# Patient Record
Sex: FEMALE | Race: WHITE | NOT HISPANIC OR LATINO | Employment: OTHER | URBAN - METROPOLITAN AREA
[De-identification: names, ages, dates, MRNs, and addresses within clinical notes are randomized per-mention and may not be internally consistent; named-entity substitution may affect disease eponyms.]

---

## 2017-01-14 LAB
25(OH)D3+25(OH)D2 SERPL-MCNC: 42.2 NG/ML (ref 30–100)
ALBUMIN SERPL-MCNC: 3.7 G/DL (ref 3.6–4.8)
ALBUMIN/GLOB SERPL: 1.5 {RATIO} (ref 1.1–2.5)
ALP SERPL-CCNC: 54 IU/L (ref 39–117)
ALT SERPL-CCNC: 13 IU/L (ref 0–32)
AST SERPL-CCNC: 17 IU/L (ref 0–40)
BILIRUB SERPL-MCNC: 0.5 MG/DL (ref 0–1.2)
BUN SERPL-MCNC: 13 MG/DL (ref 8–27)
BUN/CREAT SERPL: 19 (ref 11–26)
CALCIUM SERPL-MCNC: 8.8 MG/DL (ref 8.7–10.3)
CHLORIDE SERPL-SCNC: 101 MMOL/L (ref 96–106)
CHOLEST SERPL-MCNC: 182 MG/DL (ref 100–199)
CK SERPL-CCNC: 54 U/L (ref 24–173)
CO2 SERPL-SCNC: 23 MMOL/L (ref 18–29)
COMMENT 011824: ABNORMAL
CREAT SERPL-MCNC: 0.69 MG/DL (ref 0.57–1)
FOLATE SERPL-MCNC: 13.9 NG/ML
GLOBULIN SER CALC-MCNC: 2.5 G/DL (ref 1.5–4.5)
GLUCOSE SERPL-MCNC: 83 MG/DL (ref 65–99)
HDLC SERPL-MCNC: 68 MG/DL
LDLC SERPL CALC-MCNC: 103 MG/DL (ref 0–99)
POTASSIUM SERPL-SCNC: 4.1 MMOL/L (ref 3.5–5.2)
PROT SERPL-MCNC: 6.2 G/DL (ref 6–8.5)
SODIUM SERPL-SCNC: 140 MMOL/L (ref 134–144)
TRIGL SERPL-MCNC: 57 MG/DL (ref 0–149)
VIT B12 SERPL-MCNC: >2000 PG/ML (ref 211–946)
VLDLC SERPL CALC-MCNC: 11 MG/DL (ref 5–40)

## 2017-01-17 ENCOUNTER — OFFICE VISIT (OUTPATIENT)
Dept: MEDICAL GROUP | Facility: MEDICAL CENTER | Age: 72
End: 2017-01-17
Payer: MEDICARE

## 2017-01-17 VITALS
DIASTOLIC BLOOD PRESSURE: 78 MMHG | TEMPERATURE: 97.6 F | BODY MASS INDEX: 23.74 KG/M2 | WEIGHT: 134 LBS | OXYGEN SATURATION: 96 % | SYSTOLIC BLOOD PRESSURE: 120 MMHG | HEART RATE: 72 BPM | HEIGHT: 63 IN

## 2017-01-17 DIAGNOSIS — E53.8 B12 DEFICIENCY: ICD-10-CM

## 2017-01-17 DIAGNOSIS — E78.5 HYPERLIPIDEMIA LDL GOAL <130: ICD-10-CM

## 2017-01-17 PROCEDURE — 99213 OFFICE O/P EST LOW 20 MIN: CPT | Performed by: NURSE PRACTITIONER

## 2017-01-17 RX ORDER — ROSUVASTATIN CALCIUM 5 MG/1
5 TABLET, COATED ORAL
Qty: 96 TAB | Refills: 0 | Status: SHIPPED | OUTPATIENT
Start: 2017-01-17 | End: 2017-10-09 | Stop reason: SDUPTHER

## 2017-01-17 NOTE — MR AVS SNAPSHOT
"        Stephania Galloway   2017 11:00 AM   Office Visit   MRN: [id number]    Department:  [facility]   Dept Phone:  [phone]    Description:  Female : 1945   Provider:  DANIEL Pang           Allergies as of 2017     Allergen Noted Reactions    Augmentin 2012       Statins [Hmg-Coa-R Inhibitors] 2016       myalgias      You were diagnosed with     Hyperlipidemia LDL goal <130   [598689]   crestor 5 mg 2x/week. r echeck lab with cmp, lp, cpk in 2 months.  f/u after lab      Vital Signs     Blood Pressure Pulse Temperature Height Weight Body Mass Index    120/78 mmHg 72 36.4 °C (97.6 °F) 1.6 m (5' 3\") 60.782 kg (134 lb) 23.74 kg/m2    Oxygen Saturation Breastfeeding? Smoking Status             96% No Current Some Day Smoker         Basic Information     Date Of Birth Sex Race Ethnicity Preferred Language    1945 Female White Non- English      Your appointments     2017 12:30 PM   [facility] with [facility] [facility] ([address])    [address]  [address]  [facility] [address] [phone]           Check in 30 minutes prior.              Problem List              ICD-10-CM Priority Class Noted - Resolved    Vitamin B12 deficiency E53.8   10/4/2012 - Present    Postmenopausal HRT (hormone replacement therapy) Z79.890   10/4/2012 - Present    Seasonal allergies J30.2   10/4/2012 - Present    Osteopenia M85.80   10/7/2012 - Present    History of melanoma Z85.820   10/7/2012 - Present    Preventative health care Z00.00   10/17/2013 - Present    Vitamin D deficiency disease E55.9   Unknown - Present    Hyperlipidemia LDL goal <130 E78.5   11/3/2016 - Present      Health Maintenance        Date Due Completion Dates    IMM DTaP/Tdap/Td Vaccine (1 - Tdap) 1966 ---    IMM ZOSTER VACCINE 2007 ---    BONE DENSITY 2014 (Done)    Override on 2009: Done (L +1.2, H +1.0)    MAMMOGRAM 11/3/2017 11/3/2016, " 11/3/2016 (Done), 10/29/2015, 10/29/2015 (Done), 10/23/2014, 10/17/2013, 10/11/2012, 11/14/2011 (Done)    Override on 11/3/2016: Done    Override on 10/29/2015: Done    Override on 11/14/2011: Done (per old records)    COLONOSCOPY 12/19/2024 12/19/2014 (Done)    Override on 12/19/2014: Done            Current Immunizations     13-VALENT PCV PREVNAR 12/16/2015    Influenza TIV (IM) 10/17/2013    Influenza Vaccine Adult HD 10/3/2016, 11/19/2015, 10/23/2014    Pneumococcal polysaccharide vaccine (PPSV-23) 10/16/2012 11:20 AM      Below and/or attached are the medications your provider expects you to take. Review all of your home medications and newly ordered medications with your provider and/or pharmacist. Follow medication instructions as directed by your provider and/or pharmacist. Please keep your medication list with you and share with your provider. Update the information when medications are discontinued, doses are changed, or new medications (including over-the-counter products) are added; and carry medication information at all times in the event of emergency situations     Allergies:  AUGMENTIN - (reactions not documented)     STATINS - (reactions not documented)               Medications  Valid as of: January 17, 2017 - 11:16 AM    Generic Name Brand Name Tablet Size Instructions for use    Cyanocobalamin (SL Tab) Vitamin B-12 1000 MCG Place 1 Tab under tongue every 48 hours.          Doxycycline (CAPSULE DELAYED RELEASE) ORACEA 40 MG Take 1 Cap by mouth every morning.        Estradiol (PATCH BIWEEKLY) VIVELLE 0.0375 MG/24HR Apply 1 Patch to skin as directed every 72 hours.        Loratadine (Tab) CLARITIN 10 MG Take 10 mg by mouth every day.        Non Formulary Request Non Formulary Request  Apply 1 Application to affected area(s) every day. DHEA/prog/test 10/50/3mg/gm apply 1 ml topically daily except for sundays.        Rosuvastatin Calcium (Tab) CRESTOR 5 MG Take 1 Tab by mouth every 72 hours.        .                  Medicines prescribed today were sent to:     [facility] #[id number] - [place], [place] - [address]    [address] [place] [address]    Phone: [phone] Fax: [phone]    Open 24 Hours?: No    [facility] - [place] - [address]    [address] [place] [address]    Phone: [phone] Fax: [phone]    Open 24 Hours?: No      Medication refill instructions:       If your prescription bottle indicates you have medication refills left, it is not necessary to call your provider’s office. Please contact your pharmacy and they will refill your medication.    If your prescription bottle indicates you do not have any refills left, you may request refills at any time through one of the following ways: The online [employer] system (except Urgent Care), by calling your provider’s office, or by asking your pharmacy to contact your provider’s office with a refill request. Medication refills are processed only during regular business hours and may not be available until the next business day. Your provider may request additional information or to have a follow-up visit with you prior to refilling your medication.   *Please Note: Medication refills are assigned a new Rx number when refilled electronically. Your pharmacy may indicate that no refills were authorized even though a new prescription for the same medication is available at the pharmacy. Please request the medicine by name with the pharmacy before contacting your provider for a refill.        Other Notes About Your Plan     Old records scanned.  No immunization dates, colonoscopy report, or other imaging included.           [employer] Access Code: Activation code not generated  Current [employer] Status: Active

## 2017-01-17 NOTE — PROGRESS NOTES
Subjective:      Stephania Galloway is a 71 y.o. female who presents with No chief complaint on file.            HPI  Seen in f/uf or hyperlipidemia. She has been intolerant of many statins.  Was started on crestor 2x/week at last appt.    Reviewed lab with pt. Her LP shows trg and HDL is wnl.  LDL is down from 169 to 103 now.    She needs refills on crestor.  Ran out in early jan.  b12 is >2000,.  She is taking b12 daily.    Folate, CMP, GFR, D is wnl.  CPK was up to 381 on other statin in may.  Now down to normal.  Not having any s/e to crestor.  All leg pain is resolved.    Patient Active Problem List    Diagnosis Date Noted   • Hyperlipidemia LDL goal <130 11/03/2016   • Vitamin D deficiency disease    • Preventative health care 10/17/2013   • Osteopenia 10/07/2012   • History of melanoma 10/07/2012   • Vitamin B12 deficiency 10/04/2012   • Postmenopausal HRT (hormone replacement therapy) 10/04/2012   • Seasonal allergies 10/04/2012     Current Outpatient Prescriptions   Medication Sig Dispense Refill   • Non Formulary Request Apply 1 Application to affected area(s) every day. DHEA/prog/test 10/50/3mg/gm apply 1 ml topically daily except for sundays. 1 Each 3   • rosuvastatin (CRESTOR) 5 MG Tab Take 1 Tab by mouth every 72 hours. 8 Tab 2   • estradiol (VIVELLE-DOT) 0.0375 MG/24HR patch Apply 1 Patch to skin as directed every 72 hours. 24 Patch 3   • doxycycline (ORACEA) 40 MG capsule Take 1 Cap by mouth every morning. 30 Cap 3   • loratadine (CLARITIN) 10 MG TABS Take 10 mg by mouth every day.     • Cyanocobalamin (VITAMIN B-12) 1000 MCG SUBL Place 1 Tab under tongue every 48 hours.         No current facility-administered medications for this visit.     Allergies   Allergen Reactions   • Augmentin    • Statins [Hmg-Coa-R Inhibitors]      myalgias       ROS    Review of Systems   Constitutional: Negative.  Negative for fever, chills, weight loss, malaise/fatigue and diaphoresis.   HENT: Negative.  Negative for hearing  "loss, ear pain, nosebleeds, congestion, sore throat, neck pain, tinnitus and ear discharge.    Respiratory: Negative.  Negative for cough, hemoptysis, sputum production, shortness of breath, wheezing and stridor.    Cardiovascular: Negative.  Negative for chest pain, palpitations, orthopnea, claudication, leg swelling and PND.   Gastrointestinal: denies nausea, vomiting, diarrhea, constipation, heartburn, melena or hematochezia.  Genitourinary: Denies dysuria, hematuria, urinary incontinence, frequency or urgency.         Objective:     /78 mmHg  Pulse 72  Temp(Src) 36.4 °C (97.6 °F)  Ht 1.6 m (5' 3\")  Wt 60.782 kg (134 lb)  BMI 23.74 kg/m2  SpO2 96%  Breastfeeding? No     Physical Exam      Physical Exam   Vitals reviewed.  Constitutional: oriented to person, place, and time. appears well-developed and well-nourished. No distress.   Cardiovascular: Normal rate, regular rhythm, normal heart sounds and intact distal pulses.  Exam reveals no gallop and no friction rub.  No murmur heard.  No carotid bruits.   Pulmonary/Chest: Effort normal and breath sounds normal. No stridor. No respiratory distress. no wheezes or rales. exhibits no tenderness.   Musculoskeletal: Normal range of motion. exhibits no edema. radha pedal pulses 2+.  Neurological: alert and oriented to person, place, and time. exhibits normal muscle tone. Coordination normal.   Skin: Skin is warm and dry. no diaphoresis.   Psychiatric: normal mood and affect. behavior is normal.            Assessment/Plan:       1. Hyperlipidemia LDL goal <130  rosuvastatin (CRESTOR) 5 MG Tab    crestor 5 mg 2x/week. refilled meds.  f/u 10/17 call for lab slip.     2. B12 deficiency      b12 lab is high.  take only qod.         "

## 2017-02-08 ENCOUNTER — HOSPITAL ENCOUNTER (OUTPATIENT)
Dept: RADIOLOGY | Facility: MEDICAL CENTER | Age: 72
End: 2017-02-08
Attending: NURSE PRACTITIONER
Payer: COMMERCIAL

## 2017-02-08 DIAGNOSIS — R92.2 DENSE BREAST: ICD-10-CM

## 2017-02-08 DIAGNOSIS — R92.30 DENSE BREAST: ICD-10-CM

## 2017-02-08 PROCEDURE — 4410555 US-SCREENING BREAST (SONOCINE)

## 2017-09-27 ENCOUNTER — APPOINTMENT (RX ONLY)
Dept: URBAN - METROPOLITAN AREA CLINIC 4 | Facility: CLINIC | Age: 72
Setting detail: DERMATOLOGY
End: 2017-09-27

## 2017-09-27 DIAGNOSIS — D22 MELANOCYTIC NEVI: ICD-10-CM

## 2017-09-27 DIAGNOSIS — L82.0 INFLAMED SEBORRHEIC KERATOSIS: ICD-10-CM

## 2017-09-27 DIAGNOSIS — L82.1 OTHER SEBORRHEIC KERATOSIS: ICD-10-CM

## 2017-09-27 DIAGNOSIS — D18.0 HEMANGIOMA: ICD-10-CM

## 2017-09-27 DIAGNOSIS — L81.4 OTHER MELANIN HYPERPIGMENTATION: ICD-10-CM

## 2017-09-27 PROBLEM — D18.01 HEMANGIOMA OF SKIN AND SUBCUTANEOUS TISSUE: Status: ACTIVE | Noted: 2017-09-27

## 2017-09-27 PROBLEM — D22.61 MELANOCYTIC NEVI OF RIGHT UPPER LIMB, INCLUDING SHOULDER: Status: ACTIVE | Noted: 2017-09-27

## 2017-09-27 PROBLEM — D22.71 MELANOCYTIC NEVI OF RIGHT LOWER LIMB, INCLUDING HIP: Status: ACTIVE | Noted: 2017-09-27

## 2017-09-27 PROBLEM — D22.72 MELANOCYTIC NEVI OF LEFT LOWER LIMB, INCLUDING HIP: Status: ACTIVE | Noted: 2017-09-27

## 2017-09-27 PROBLEM — D22.4 MELANOCYTIC NEVI OF SCALP AND NECK: Status: ACTIVE | Noted: 2017-09-27

## 2017-09-27 PROBLEM — D22.62 MELANOCYTIC NEVI OF LEFT UPPER LIMB, INCLUDING SHOULDER: Status: ACTIVE | Noted: 2017-09-27

## 2017-09-27 PROBLEM — D22.5 MELANOCYTIC NEVI OF TRUNK: Status: ACTIVE | Noted: 2017-09-27

## 2017-09-27 PROCEDURE — ? COUNSELING

## 2017-09-27 PROCEDURE — ? LIQUID NITROGEN

## 2017-09-27 PROCEDURE — 17110 DESTRUCTION B9 LES UP TO 14: CPT

## 2017-09-27 PROCEDURE — 99213 OFFICE O/P EST LOW 20 MIN: CPT | Mod: 25

## 2017-09-27 ASSESSMENT — LOCATION DETAILED DESCRIPTION DERM
LOCATION DETAILED: PERIUMBILICAL SKIN
LOCATION DETAILED: LEFT MEDIAL BREAST 10-11:00 REGION
LOCATION DETAILED: LEFT MEDIAL SUPERIOR CHEST
LOCATION DETAILED: LEFT SUPERIOR OCCIPITAL SCALP
LOCATION DETAILED: EPIGASTRIC SKIN
LOCATION DETAILED: RIGHT ANTERIOR DISTAL THIGH
LOCATION DETAILED: SUPERIOR LUMBAR SPINE
LOCATION DETAILED: INFERIOR LUMBAR SPINE
LOCATION DETAILED: RIGHT ANTECUBITAL SKIN
LOCATION DETAILED: RIGHT ANTERIOR PROXIMAL THIGH
LOCATION DETAILED: SUPERIOR THORACIC SPINE
LOCATION DETAILED: LEFT SUPERIOR MEDIAL LOWER BACK
LOCATION DETAILED: RIGHT MEDIAL UPPER BACK
LOCATION DETAILED: RIGHT DORSAL MIDDLE METACARPOPHALANGEAL JOINT
LOCATION DETAILED: RIGHT RADIAL DORSAL HAND
LOCATION DETAILED: MIDDLE STERNUM
LOCATION DETAILED: RIGHT KNEE
LOCATION DETAILED: LEFT ANTERIOR PROXIMAL UPPER ARM
LOCATION DETAILED: RIGHT ANTERIOR DISTAL UPPER ARM
LOCATION DETAILED: LEFT ANTERIOR DISTAL UPPER ARM
LOCATION DETAILED: LEFT ANTERIOR PROXIMAL THIGH
LOCATION DETAILED: LEFT UPPER CUTANEOUS LIP
LOCATION DETAILED: LEFT ANTERIOR DISTAL THIGH
LOCATION DETAILED: STERNAL NOTCH

## 2017-09-27 ASSESSMENT — LOCATION SIMPLE DESCRIPTION DERM
LOCATION SIMPLE: RIGHT HAND
LOCATION SIMPLE: LOWER BACK
LOCATION SIMPLE: LEFT UPPER ARM
LOCATION SIMPLE: RIGHT UPPER ARM
LOCATION SIMPLE: RIGHT THIGH
LOCATION SIMPLE: RIGHT KNEE
LOCATION SIMPLE: LEFT LOWER BACK
LOCATION SIMPLE: LEFT BREAST
LOCATION SIMPLE: LEFT LIP
LOCATION SIMPLE: CHEST
LOCATION SIMPLE: LEFT THIGH
LOCATION SIMPLE: ABDOMEN
LOCATION SIMPLE: UPPER BACK
LOCATION SIMPLE: LEFT OCCIPITAL SCALP
LOCATION SIMPLE: RIGHT UPPER BACK

## 2017-09-27 ASSESSMENT — LOCATION ZONE DERM
LOCATION ZONE: TRUNK
LOCATION ZONE: LIP
LOCATION ZONE: SCALP
LOCATION ZONE: LEG
LOCATION ZONE: HAND
LOCATION ZONE: ARM

## 2017-09-27 NOTE — PROCEDURE: LIQUID NITROGEN
Medical Necessity Clause: This procedure was medically necessary because the lesions that were treated were:
Add 52 Modifier (Optional): no
Detail Level: Detailed
Post-Care Instructions: I reviewed with the patient in detail post-care instructions. Patient is to wear sunprotection, and avoid picking at any of the treated lesions. Pt may apply Vaseline to crusted or scabbing areas.
Medical Necessity Information: It is in your best interest to select a reason for this procedure from the list below. All of these items fulfill various CMS LCD requirements except the new and changing color options.
Consent: The patient's consent was obtained including but not limited to risks of crusting, scabbing, blistering, scarring, darker or lighter pigmentary change, recurrence, incomplete removal and infection.

## 2017-09-27 NOTE — HPI: FULL BODY SKIN EXAMINATION
How Severe Are Your Spot(S)?: mild
What Is The Reason For Today's Visit?: Full Body Skin Examination
What Is The Reason For Today's Visit? (Being Monitored For X): concerning skin lesions on an annual basis
Additional History: Pt states spot on right leg for at least 2months. FBE

## 2017-10-09 DIAGNOSIS — E78.5 HYPERLIPIDEMIA LDL GOAL <130: ICD-10-CM

## 2017-10-09 RX ORDER — ROSUVASTATIN CALCIUM 5 MG/1
TABLET, COATED ORAL
Qty: 96 TAB | Refills: 0 | Status: SHIPPED | OUTPATIENT
Start: 2017-10-09 | End: 2018-03-14

## 2017-10-09 NOTE — LETTER
October 9, 2017        Stephania Galloway  [address] [facility] [facility] [place] [address]        Dear Stephania:    We have received a request from your pharmacy to refill your prescription(s). After careful review of your chart, we have noted you are due for labs and a follow up appointment.  We request you call our office at [phone] at your earliest convenience and make an appointment. I have included a fasting lab order for you.    However, when patients are not followed closely by their physician, potential medication complications may go unadressed. We look forward to scheduling an appointment for you, so that we may provide you with the safest and most complete medical care.        If you have any questions or concerns, please don't hesitate to call.        Sincerely,        OPAL Ogden.    Electronically Signed

## 2017-10-30 ENCOUNTER — HOSPITAL ENCOUNTER (OUTPATIENT)
Dept: LAB | Facility: MEDICAL CENTER | Age: 72
End: 2017-10-30
Attending: NURSE PRACTITIONER
Payer: MEDICARE

## 2017-10-30 DIAGNOSIS — E78.5 HYPERLIPIDEMIA LDL GOAL <130: ICD-10-CM

## 2017-10-30 LAB
CHOLEST SERPL-MCNC: 209 MG/DL (ref 100–199)
HDLC SERPL-MCNC: 70 MG/DL
LDLC SERPL CALC-MCNC: 125 MG/DL
TRIGL SERPL-MCNC: 71 MG/DL (ref 0–149)

## 2017-10-30 PROCEDURE — 80061 LIPID PANEL: CPT

## 2017-10-30 PROCEDURE — 36415 COLL VENOUS BLD VENIPUNCTURE: CPT

## 2017-11-08 ENCOUNTER — HOSPITAL ENCOUNTER (OUTPATIENT)
Dept: RADIOLOGY | Facility: MEDICAL CENTER | Age: 72
End: 2017-11-08
Attending: NURSE PRACTITIONER
Payer: MEDICARE

## 2017-11-08 ENCOUNTER — OFFICE VISIT (OUTPATIENT)
Dept: MEDICAL GROUP | Facility: MEDICAL CENTER | Age: 72
End: 2017-11-08
Payer: MEDICARE

## 2017-11-08 VITALS
OXYGEN SATURATION: 98 % | DIASTOLIC BLOOD PRESSURE: 70 MMHG | HEART RATE: 68 BPM | SYSTOLIC BLOOD PRESSURE: 110 MMHG | HEIGHT: 63 IN | TEMPERATURE: 97 F | WEIGHT: 137 LBS | BODY MASS INDEX: 24.27 KG/M2

## 2017-11-08 DIAGNOSIS — Z79.890 POSTMENOPAUSAL HRT (HORMONE REPLACEMENT THERAPY): ICD-10-CM

## 2017-11-08 DIAGNOSIS — E55.9 VITAMIN D DEFICIENCY DISEASE: ICD-10-CM

## 2017-11-08 DIAGNOSIS — Z23 NEED FOR TDAP VACCINATION: ICD-10-CM

## 2017-11-08 DIAGNOSIS — E78.5 HYPERLIPIDEMIA LDL GOAL <130: ICD-10-CM

## 2017-11-08 DIAGNOSIS — M85.80 OSTEOPENIA, UNSPECIFIED LOCATION: ICD-10-CM

## 2017-11-08 DIAGNOSIS — E53.8 VITAMIN B12 DEFICIENCY: ICD-10-CM

## 2017-11-08 DIAGNOSIS — J30.1 CHRONIC SEASONAL ALLERGIC RHINITIS DUE TO POLLEN: ICD-10-CM

## 2017-11-08 DIAGNOSIS — H40.1211 LOW TENSION GLAUCOMA OF RIGHT EYE, MILD STAGE: ICD-10-CM

## 2017-11-08 DIAGNOSIS — N95.1 MENOPAUSAL STATE: ICD-10-CM

## 2017-11-08 DIAGNOSIS — Z85.820 HISTORY OF MELANOMA: ICD-10-CM

## 2017-11-08 DIAGNOSIS — Z12.31 ENCOUNTER FOR SCREENING MAMMOGRAM FOR BREAST CANCER: ICD-10-CM

## 2017-11-08 PROCEDURE — 90715 TDAP VACCINE 7 YRS/> IM: CPT | Performed by: NURSE PRACTITIONER

## 2017-11-08 PROCEDURE — G0439 PPPS, SUBSEQ VISIT: HCPCS | Mod: 25 | Performed by: NURSE PRACTITIONER

## 2017-11-08 PROCEDURE — G0202 SCR MAMMO BI INCL CAD: HCPCS

## 2017-11-08 PROCEDURE — 90471 IMMUNIZATION ADMIN: CPT | Performed by: NURSE PRACTITIONER

## 2017-11-08 RX ORDER — ESTRADIOL 0.03 MG/D
1 FILM, EXTENDED RELEASE TRANSDERMAL
Qty: 24 PATCH | Refills: 3 | Status: SHIPPED | OUTPATIENT
Start: 2017-11-08 | End: 2018-10-04 | Stop reason: SDUPTHER

## 2017-11-08 ASSESSMENT — PATIENT HEALTH QUESTIONNAIRE - PHQ9: CLINICAL INTERPRETATION OF PHQ2 SCORE: 0

## 2017-11-08 NOTE — ASSESSMENT & PLAN NOTE
She was on crestor. Has not been taking as regularly was ordered. Was supposed to take 2x/wk.  Will restart.  Her LP shows that trg adn HDL are great.  LDL is up from 103 to 125.  Her goal is <100.

## 2017-11-08 NOTE — ASSESSMENT & PLAN NOTE
She is using otc antihistamines.  Now using zyrtec but also uses others.  No sx of infection currently.

## 2017-11-08 NOTE — ASSESSMENT & PLAN NOTE
She was on vivelle dot.  She started having nipple tenderness and thighs swelling.  She stopped the vivelle dot for 2 weeks.  Her sx resolved.  She has restarted at 1/2 the dose.  Needs refills on meds.  She is on 0.0375.

## 2017-11-08 NOTE — PROGRESS NOTES
Subjective:      Stephania Galloway is a 72 y.o. female who presents with Annual Exam            HPI  Seen in fu/ for HRA.  She is feeling great.    She is due a Tdap.    Needs refills on meds.      Hyperlipidemia LDL goal <130  She was on crestor. Has not been taking as regularly was ordered. Was supposed to take 2x/wk.  Will restart.  Her LP shows that trg adn HDL are great.  LDL is up from 103 to 125.  Her goal is <100.      Postmenopausal HRT (hormone replacement therapy)  She was on vivelle dot.  She started having nipple tenderness and thighs swelling.  She stopped the vivelle dot for 2 weeks.  Her sx resolved.  She has restarted at 1/2 the dose.  Needs refills on meds.  She is on 0.0375.      Glaucoma (increased eye pressure)  rt eye.  followed by [facility].  This is anew dx.  On eye gtts.      History of melanoma  Followed by Carrie Blackwood.  Recent eval was wnl.  No current xoncerns.    Osteopenia  Pt declines at this time.  She is using ca++ and d.  Exercises sometimes.      Seasonal allergies  She is using otc antihistamines.  Now using zyrtec but also uses others.  No sx of infection currently.      Vitamin B12 deficiency  She is on otc supplement.  Her last b12 and folate in 1/17 was wnl.      Vitamin D deficiency disease  She is on otc supplement.  Her last d in 1/17 was wnl.          Patient Active Problem List    Diagnosis Date Noted   • Hyperlipidemia LDL goal <130 11/03/2016   • Vitamin D deficiency disease    • Preventative health care 10/17/2013   • Osteopenia 10/07/2012   • History of melanoma 10/07/2012   • Vitamin B12 deficiency 10/04/2012   • Postmenopausal HRT (hormone replacement therapy) 10/04/2012   • Seasonal allergies 10/04/2012     Current Outpatient Prescriptions   Medication Sig Dispense Refill   • rosuvastatin (CRESTOR) 5 MG Tab TAKE 1 TABLET BY MOUTH EVERY 72 HOURS. 96 Tab 0   • Non Formulary Request Apply 1 Application to affected area(s) every day. DHEA/prog/test 10/50/3mg/gm apply 1 ml  topically daily except for sundays. 1 Each 3   • estradiol (VIVELLE-DOT) 0.0375 MG/24HR patch Apply 1 Patch to skin as directed every 72 hours. 24 Patch 3   • doxycycline (ORACEA) 40 MG capsule Take 1 Cap by mouth every morning. 30 Cap 3   • loratadine (CLARITIN) 10 MG TABS Take 10 mg by mouth every day.     • Cyanocobalamin (VITAMIN B-12) 1000 MCG SUBL Place 1 Tab under tongue every 48 hours.         No current facility-administered medications for this visit.      Allergies   Allergen Reactions   • Augmentin    • Statins [Hmg-Coa-R Inhibitors]      myalgias       ROS  Review of Systems   Constitutional: Negative.  Negative for fever, chills, weight loss, malaise/fatigue and diaphoresis.   HENT: Negative.  Negative for hearing loss, ear pain, nosebleeds, congestion, sore throat, neck pain, tinnitus and ear discharge.    Eyes: Negative.  Negative for blurred vision, double vision, photophobia, pain, discharge and redness.   Respiratory: Negative.  Negative for cough, hemoptysis, sputum production, shortness of breath, wheezing and stridor.    Cardiovascular: Negative.  Negative for chest pain, palpitations, orthopnea, claudication, leg swelling and PND.   Gastrointestinal: Negative.  Negative for heartburn, nausea, vomiting, abdominal pain, diarrhea, constipation, blood in stool and melena.   Genitourinary: Negative.  Negative for dysuria, urgency, frequency, incontinence, hematuria and flank pain.   Musculoskeletal: Negative.  Negative for myalgias, back pain, joint pain and falls.   Skin: Negative.  Negative for itching and rash.  followed by derm.    Neurological: Negative.  Negative for dizziness, tingling, tremors, sensory change, speech change, focal weakness, seizures, loss of consciousness, weakness and headaches.   Endo/Heme/Allergies: Negative.  Negative for environmental allergies and polydipsia. Does not bruise/bleed easily.   Psychiatric/Behavioral: Negative.  Negative for depression, suicidal ideas,  hallucinations, memory loss and substance abuse. The patient is not nervous/anxious and does not have insomnia.    All other systems reviewed and are negative.      Chief Complaint   Patient presents with   • Annual Exam         HPI:  Stephania Galloway is a 72 y.o. here for Medicare Annual Wellness Visit     Patient Active Problem List    Diagnosis Date Noted   • Hyperlipidemia LDL goal <130 11/03/2016   • Vitamin D deficiency disease    • Preventative health care 10/17/2013   • Osteopenia 10/07/2012   • History of melanoma 10/07/2012   • Vitamin B12 deficiency 10/04/2012   • Postmenopausal HRT (hormone replacement therapy) 10/04/2012   • Seasonal allergies 10/04/2012       Current Outpatient Prescriptions   Medication Sig Dispense Refill   • rosuvastatin (CRESTOR) 5 MG Tab TAKE 1 TABLET BY MOUTH EVERY 72 HOURS. 96 Tab 0   • Non Formulary Request Apply 1 Application to affected area(s) every day. DHEA/prog/test 10/50/3mg/gm apply 1 ml topically daily except for sundays. 1 Each 3   • estradiol (VIVELLE-DOT) 0.0375 MG/24HR patch Apply 1 Patch to skin as directed every 72 hours. 24 Patch 3   • doxycycline (ORACEA) 40 MG capsule Take 1 Cap by mouth every morning. 30 Cap 3   • loratadine (CLARITIN) 10 MG TABS Take 10 mg by mouth every day.     • Cyanocobalamin (VITAMIN B-12) 1000 MCG SUBL Place 1 Tab under tongue every 48 hours.         No current facility-administered medications for this visit.             Current supplements as per medication list.       Allergies: Augmentin and Statins [hmg-coa-r inhibitors]    Current social contact/activities:   1.  Monthly go to [employer]  2.  Reading  3.  Cooking  4.  Gardening  5.  Golf  6.  crafts     She  reports that she has been smoking.  She has never used smokeless tobacco. She reports that she drinks alcohol. She reports that she does not use drugs.  Ready to quit: No  Counseling given: Yes        DPA/Advanced Directive:  Patient has Advanced Directive, Living Will and Durable  Power of , but it is not on file. Instructed to bring in a copy to scan into their chart.      ROS:    Gait: Uses no assistive device   Ostomy: no   Other tubes: no   Amputations: no   Chronic oxygen use: no   Last eye exam:9/11/17   : Denies incontinence.       Screening:  none  Depression Screening    Little interest or pleasure in doing things?  0 - not at all  Feeling down, depressed , or hopeless? 0 - not at all  Patient Health Questionnaire Score: 0     If depressive symptoms identified deferred to follow up visit unless specifically addressed in assessment and plan.    Interpretation of PHQ-9 Total Score   Score Severity   1-4 No Depression   5-9 Mild Depression   10-14 Moderate Depression   15-19 Moderately Severe Depression   20-27 Severe Depression    Screening for Cognitive Impairment    Three Minute Recall (apple, watch, aimee)  3/3    Draw clock face with all 12 numbers set to the hand to show 10 minutes past 11 o'clock  1    Cognitive concerns identified deferred for follow up unless specifically addressed in assessment and plan.    Fall Risk Assessment    Has the patient had two or more falls in the last year or any fall with injury in the last year?  No    Safety Assessment    Throw rugs on floor.  No  Handrails on all stairs.  Yes  Good lighting in all hallways.  Yes  Difficulty hearing.  No  Patient counseled about all safety risks that were identified.    Functional Assessment ADLs    Are there any barriers preventing you from cooking for yourself or meeting nutritional needs?  No.    Are there any barriers preventing you from driving safely or obtaining transportation?  No.    Are there any barriers preventing you from using a telephone or calling for help?  No.    Are there any barriers preventing you from shopping?  No.    Are there any barriers preventing you from taking care of your own finances?  No.    Are there any barriers preventing you from managing your medications?  No.   "  Are currently engaging any exercise or physical activity?  Yes.       Health Maintenance Summary                IMM DTaP/Tdap/Td Vaccine Overdue 9/9/1966     BONE DENSITY Overdue 11/4/2014      Done 11/4/2009 L +1.2, H +1.0    MAMMOGRAM Overdue 11/3/2017      Done 11/3/2016      Patient has more history with this topic...    Annual Wellness Visit Next Due 11/9/2018      Done 11/8/2017     COLONOSCOPY Next Due 12/19/2024      Done 12/19/2014           Patient Care Team:  DANIEL Ogden as PCP - General (Family Medicine)      Social History   Substance Use Topics   • Smoking status: Current Some Day Smoker   • Smokeless tobacco: Never Used      Comment: only smokes socially   • Alcohol use 0.0 oz/week     Family History   Problem Relation Age of Onset   • Cancer Father      colon cancer dx at age ?   • Cancer Paternal Grandfather 80     colon cancer    • Heart Disease Mother    • Stroke Mother    • Cancer Maternal Grandmother      breast     She  has a past medical history of Allergic rhinitis due to allergen; B12 deficiency; History of melanoma; Hormone replacement therapy (postmenopausal); Hyperlipemia; Osteopenia; and Vitamin D deficiency disease. She also has no past medical history of Breast cancer (CMS-[facility]).   History reviewed. No pertinent surgical history.    Exam:     Blood pressure 110/70, pulse 68, temperature 36.1 °C (97 °F), height 1.6 m (5' 3\"), weight 62.1 kg (137 lb), SpO2 98 %, not currently breastfeeding. Body mass index is 24.27 kg/m².    Hearing excellent.    Dentition good  Alert, oriented in no acute distress.  Eye contact is good, speech goal directed, affect calm      Assessment and Plan. The following treatment and monitoring plan is recommended:          Services suggested: No services needed at this time  Health Care Screening: Age-appropriate preventive services Medicare covers discussed today and ordered if indicated.  Referrals offered: Community-based lifestyle interventions to " "reduce health risks and promote self-management and wellness, fall prevention, nutrition, physical activity, tobacco-use cessation, weight loss, and mental health services as per orders if indicated.    Discussion today about general wellness and lifestyle habits:    · Prevent falls and reduce trip hazards; Cautioned about securing or removing rugs.  · Have a working fire alarm and carbon monoxide detector;   · Engage in regular physical activity and social activities       Follow-up:  Yearly, call for lab slip     Objective:     /70   Pulse 68   Temp 36.1 °C (97 °F)   Ht 1.6 m (5' 3\")   Wt 62.1 kg (137 lb)   SpO2 98%   Breastfeeding? No   BMI 24.27 kg/m²      Physical Exam  Physical Exam   Vitals reviewed.  Constitutional: oriented to person, place, and time. appears well-developed and well-nourished. No distress.   HENT: Head: Normocephalic and atraumatic. Bilateral tympanic membranes wnl w/o bulging.  Right Ear: External ear normal. Left Ear: External ear normal. Nose: Nose normal.  Mouth/Throat: Oropharynx is clear and moist. No oropharyngeal exudate. radha tm wnl. Eyes: Conjunctivae and EOM are normal. Pupils are equal, round, and reactive to light. Right eye exhibits no discharge. Left eye exhibits no discharge. No scleral icterus.    Neck: Normal range of motion. Neck supple. No JVD present.   Cardiovascular: Normal rate, regular rhythm, normal heart sounds and intact distal pulses.  Exam reveals no gallop and no friction rub.  No murmur heard.  No carotid bruits   Pulmonary/Chest: Effort normal and breath sounds normal. No stridor. No respiratory distress. no wheezes or rales. exhibits no tenderness.   Abdominal: Soft. Bowel sounds are normal. exhibits no distension and no mass. No tenderness. no rebound and no guarding.   Musculoskeletal: Normal range of motion. exhibits no edema or tenderness.  radha pedal pulses 2+.  Lymphadenopathy:  no cervical or supraclavicular adenopathy.   Neurological: " alert and oriented to person, place, and time. has normal reflexes. displays normal reflexes. No cranial nerve deficit. exhibits normal muscle tone. Coordination normal.   Skin: Skin is warm and dry. No rash noted. no diaphoresis. No erythema. No pallor.   Psychiatric: normal mood and affect. behavior is normal.               Assessment/Plan:     1. Hyperlipidemia LDL goal <130      take crestor as presc.  f/u yearly.  call for lab slip.     2. Menopausal state      [id number] HRT.  she would like to dec her vivelle dot to 0.025. using 2x/wk.     3. Postmenopausal HRT (hormone replacement therapy)  Non Formulary Request    stable on med. refill meds   4. Low tension glaucoma of right eye, mild stage      followed by ophth.  continue meds   5. History of melanoma      followed by derm.  no current concerns   6. Osteopenia, unspecified location      continue ca++ and d.  start weight bearing exercise.     7. Chronic seasonal allergic rhinitis due to pollen      stable on otc meds   8. Vitamin B12 deficiency      stable on otc meds   9. Vitamin D deficiency disease      stable on otc meds   10. Need for Tdap vaccination  Tdap =>8yo IM     11.  F/u yearly.  Call for lab slip

## 2017-11-09 ENCOUNTER — TELEPHONE (OUTPATIENT)
Dept: MEDICAL GROUP | Facility: MEDICAL CENTER | Age: 72
End: 2017-11-09

## 2017-11-17 DIAGNOSIS — N95.1 MENOPAUSAL STATE: ICD-10-CM

## 2017-11-17 DIAGNOSIS — Z79.890 POSTMENOPAUSAL HRT (HORMONE REPLACEMENT THERAPY): ICD-10-CM

## 2017-11-17 RX ORDER — ESTRADIOL 0.04 MG/D
1 FILM, EXTENDED RELEASE TRANSDERMAL
Qty: 24 PATCH | Refills: 1 | OUTPATIENT
Start: 2017-11-17

## 2018-03-06 ENCOUNTER — HOSPITAL ENCOUNTER (OUTPATIENT)
Dept: LAB | Facility: MEDICAL CENTER | Age: 73
End: 2018-03-06
Attending: NURSE PRACTITIONER
Payer: MEDICARE

## 2018-03-06 DIAGNOSIS — E53.8 VITAMIN B12 DEFICIENCY: ICD-10-CM

## 2018-03-06 DIAGNOSIS — E78.5 HYPERLIPIDEMIA LDL GOAL <130: ICD-10-CM

## 2018-03-06 DIAGNOSIS — E55.9 VITAMIN D DEFICIENCY DISEASE: ICD-10-CM

## 2018-03-06 LAB
25(OH)D3 SERPL-MCNC: 42 NG/ML (ref 30–100)
ALBUMIN SERPL BCP-MCNC: 4.2 G/DL (ref 3.2–4.9)
ALBUMIN/GLOB SERPL: 1.4 G/DL
ALP SERPL-CCNC: 59 U/L (ref 30–99)
ALT SERPL-CCNC: 13 U/L (ref 2–50)
ANION GAP SERPL CALC-SCNC: 7 MMOL/L (ref 0–11.9)
AST SERPL-CCNC: 16 U/L (ref 12–45)
BILIRUB SERPL-MCNC: 0.8 MG/DL (ref 0.1–1.5)
BUN SERPL-MCNC: 14 MG/DL (ref 8–22)
CALCIUM SERPL-MCNC: 9.2 MG/DL (ref 8.5–10.5)
CHLORIDE SERPL-SCNC: 103 MMOL/L (ref 96–112)
CHOLEST SERPL-MCNC: 256 MG/DL (ref 100–199)
CK SERPL-CCNC: 52 U/L (ref 0–154)
CO2 SERPL-SCNC: 28 MMOL/L (ref 20–33)
CREAT SERPL-MCNC: 0.73 MG/DL (ref 0.5–1.4)
FOLATE SERPL-MCNC: 20.7 NG/ML
GLOBULIN SER CALC-MCNC: 2.9 G/DL (ref 1.9–3.5)
GLUCOSE SERPL-MCNC: 82 MG/DL (ref 65–99)
HDLC SERPL-MCNC: 80 MG/DL
LDLC SERPL CALC-MCNC: 164 MG/DL
POTASSIUM SERPL-SCNC: 4 MMOL/L (ref 3.6–5.5)
PROT SERPL-MCNC: 7.1 G/DL (ref 6–8.2)
SODIUM SERPL-SCNC: 138 MMOL/L (ref 135–145)
TRIGL SERPL-MCNC: 60 MG/DL (ref 0–149)
VIT B12 SERPL-MCNC: >1500 PG/ML (ref 211–911)

## 2018-03-06 PROCEDURE — 82306 VITAMIN D 25 HYDROXY: CPT

## 2018-03-06 PROCEDURE — 82746 ASSAY OF FOLIC ACID SERUM: CPT

## 2018-03-06 PROCEDURE — 80061 LIPID PANEL: CPT

## 2018-03-06 PROCEDURE — 80053 COMPREHEN METABOLIC PANEL: CPT

## 2018-03-06 PROCEDURE — 82607 VITAMIN B-12: CPT

## 2018-03-06 PROCEDURE — 82550 ASSAY OF CK (CPK): CPT

## 2018-03-06 PROCEDURE — 36415 COLL VENOUS BLD VENIPUNCTURE: CPT

## 2018-03-14 ENCOUNTER — OFFICE VISIT (OUTPATIENT)
Dept: MEDICAL GROUP | Facility: MEDICAL CENTER | Age: 73
End: 2018-03-14
Payer: MEDICARE

## 2018-03-14 VITALS
HEIGHT: 63 IN | BODY MASS INDEX: 23.57 KG/M2 | HEART RATE: 64 BPM | OXYGEN SATURATION: 98 % | SYSTOLIC BLOOD PRESSURE: 136 MMHG | TEMPERATURE: 98.2 F | DIASTOLIC BLOOD PRESSURE: 74 MMHG | WEIGHT: 133 LBS

## 2018-03-14 DIAGNOSIS — E78.5 HYPERLIPIDEMIA LDL GOAL <130: ICD-10-CM

## 2018-03-14 PROCEDURE — 99214 OFFICE O/P EST MOD 30 MIN: CPT | Performed by: NURSE PRACTITIONER

## 2018-03-14 RX ORDER — COLESEVELAM 180 1/1
1875 TABLET ORAL DAILY
Qty: 60 TAB | Refills: 2 | Status: SHIPPED | OUTPATIENT
Start: 2018-03-14 | End: 2018-05-08 | Stop reason: SDUPTHER

## 2018-03-14 NOTE — PROGRESS NOTES
Subjective:     Stephania Galloway is a 72 y.o. female who presents with hyperlipidemia.    HPI:   Seen in f/u for hyperlipidemia.  She was placed on crestor.  She has severe myalgias. Had to stop the med.  She has failed lipitor, zocor and crestor.    She has glaucoma.  She has failed multiple meds.  She had SOB with one of them.  She was just given xalatan but hasn't started yet. She is going to start tonite.  Her pressures in her eyes are elevated.   reviewed lab with pt.  Her trg and HDL are at goal.  LDL went from 103 on statin in 1/17 to 125 later in 2017. Now up to 164 with stopping all statins.  Goal is <130    Patient Active Problem List    Diagnosis Date Noted   • Glaucoma (increased eye pressure)    • Hyperlipidemia LDL goal <130 11/03/2016   • Vitamin D deficiency disease    • Preventative health care 10/17/2013   • Osteopenia 10/07/2012   • History of melanoma 10/07/2012   • Vitamin B12 deficiency 10/04/2012   • Postmenopausal HRT (hormone replacement therapy) 10/04/2012   • Seasonal allergies 10/04/2012       Current medicines (including changes today)  Current Outpatient Prescriptions   Medication Sig Dispense Refill   • Latanoprost (XALATAN OP) by Ophthalmic route.     • colesevelam (WELCHOL) 625 MG Tab Take 3 Tabs by mouth every day. 60 Tab 2   • Estradiol 0.025 MG/24HR PATCH BIWEEKLY Apply 1 Patch to skin as directed every 72 hours. 24 Patch 3   • loratadine (CLARITIN) 10 MG TABS Take 10 mg by mouth every day.     • Non Formulary Request Apply 1 Application to affected area(s) every day. DHEA/prog/test 10/50/3mg/gm apply 1 ml topically daily except for sundays. 1 Each 3   • doxycycline (ORACEA) 40 MG capsule Take 1 Cap by mouth every morning. 30 Cap 3     No current facility-administered medications for this visit.        Allergies   Allergen Reactions   • Augmentin    • Statins [Hmg-Coa-R Inhibitors]      myalgias       ROS  Constitutional: Negative. Negative for fever, chills, weight loss,  "malaise/fatigue and diaphoresis.   HENT: Negative. Negative for hearing loss, ear pain, nosebleeds, congestion, sore throat, neck pain, tinnitus and ear discharge.   Respiratory: Negative. Negative for cough, hemoptysis, sputum production, shortness of breath, wheezing and stridor.   Cardiovascular: Negative. Negative for chest pain, palpitations, orthopnea, claudication, leg swelling and PND.   Gastrointestinal: Denies nausea, vomiting, diarrhea, constipation, heartburn, melena or hematochezia.  Genitourinary: Denies dysuria, hematuria, urinary incontinence, frequency or urgency.        Objective:     Blood pressure 136/74, pulse 64, temperature 36.8 °C (98.2 °F), height 1.6 m (5' 3\"), weight 60.3 kg (133 lb), SpO2 98 %. Body mass index is 23.56 kg/m².    Physical Exam:  Vitals reviewed.  Constitutional: Oriented to person, place, and time. appears well-developed and well-nourished. No distress.   Cardiovascular: Normal rate, regular rhythm, normal heart sounds and intact distal pulses. Exam reveals no gallop and no friction rub. No murmur heard. No carotid bruits.   Pulmonary/Chest: Effort normal and breath sounds normal. No stridor. No respiratory distress. no wheezes or rales. exhibits no tenderness.   Musculoskeletal: Normal range of motion. exhibits no edema. radha pedal pulses 2+.  Lymphadenopathy: No cervical or supraclavicular adenopathy.   Neurological: Alert and oriented to person, place, and time. exhibits normal muscle tone.  Skin: Skin is warm and dry. No diaphoresis.   Psychiatric: Normal mood and affect. Behavior is normal.      Assessment and Plan:     The following treatment plan was discussed:    1. Hyperlipidemia LDL goal <130  Latanoprost (XALATAN OP)    colesevelam (WELCHOL) 625 MG Tab    LIPID PROFILE    try welchol.  will start slowly at 2 tabs daily.  recheck LP in 3 months.  f/u for review.  if not at goal or has s/e will refer to dr bloch for poss repathy         Followup: Return in about 3 " months (around 6/14/2018).

## 2018-05-08 DIAGNOSIS — E78.5 HYPERLIPIDEMIA LDL GOAL <130: ICD-10-CM

## 2018-05-08 RX ORDER — COLESEVELAM 180 1/1
1875 TABLET ORAL DAILY
Qty: 270 TAB | Refills: 2 | Status: SHIPPED | OUTPATIENT
Start: 2018-05-08 | End: 2018-09-04

## 2018-05-29 DIAGNOSIS — Z79.890 POSTMENOPAUSAL HRT (HORMONE REPLACEMENT THERAPY): ICD-10-CM

## 2018-06-06 LAB
CHOLEST SERPL-MCNC: 244 MG/DL (ref 100–199)
HDLC SERPL-MCNC: 81 MG/DL
LABORATORY COMMENT REPORT: ABNORMAL
LDLC SERPL CALC-MCNC: 146 MG/DL (ref 0–99)
TRIGL SERPL-MCNC: 83 MG/DL (ref 0–149)
VLDLC SERPL CALC-MCNC: 17 MG/DL (ref 5–40)

## 2018-06-12 ENCOUNTER — OFFICE VISIT (OUTPATIENT)
Dept: MEDICAL GROUP | Facility: MEDICAL CENTER | Age: 73
End: 2018-06-12
Payer: MEDICARE

## 2018-06-12 VITALS
WEIGHT: 129.6 LBS | OXYGEN SATURATION: 96 % | HEART RATE: 78 BPM | BODY MASS INDEX: 22.96 KG/M2 | TEMPERATURE: 98.8 F | SYSTOLIC BLOOD PRESSURE: 122 MMHG | HEIGHT: 63 IN | DIASTOLIC BLOOD PRESSURE: 86 MMHG

## 2018-06-12 DIAGNOSIS — Z79.890 POSTMENOPAUSAL HRT (HORMONE REPLACEMENT THERAPY): ICD-10-CM

## 2018-06-12 DIAGNOSIS — E78.5 HYPERLIPIDEMIA LDL GOAL <130: ICD-10-CM

## 2018-06-12 PROCEDURE — 99214 OFFICE O/P EST MOD 30 MIN: CPT | Performed by: NURSE PRACTITIONER

## 2018-06-12 RX ORDER — ATORVASTATIN CALCIUM 10 MG/1
5 TABLET, FILM COATED ORAL
Qty: 12 TAB | Refills: 1 | Status: SHIPPED | OUTPATIENT
Start: 2018-06-12 | End: 2018-09-04 | Stop reason: SDUPTHER

## 2018-06-12 NOTE — PROGRESS NOTES
Subjective:     Stephania Galloway is a 72 y.o. female who presents with hyperlipidemia.    HPI:     Seen in f/u for hyperlipidemia.  She has been on statins in the past.  She will have myalgias on statins but only after long time taking.  She has failed multiple statins.  Has been on lipitor also.  She is on wellchol now.  Her LDLi s better but not at goal.   Reviewed lab wiht pt. Her trg and HDL are great.  LDL is better.  Was on zocor in nov with .  Goal is <130.  Then off med she went up to 164.  Now on wellchol she is down to 146.    She has lost 8 lbs since november.  Eating healthy and exercising since april.    She is having a lot of difficulty with getting her bioidenticals to pharmacy.  We have apprently been sending to [facility] pharmacy.  Will give her [facility]      Patient Active Problem List    Diagnosis Date Noted   • Glaucoma (increased eye pressure)    • Hyperlipidemia LDL goal <130 11/03/2016   • Vitamin D deficiency disease    • Preventative health care 10/17/2013   • Osteopenia 10/07/2012   • History of melanoma 10/07/2012   • Vitamin B12 deficiency 10/04/2012   • Postmenopausal HRT (hormone replacement therapy) 10/04/2012   • Seasonal allergies 10/04/2012       Current medicines (including changes today)  Current Outpatient Prescriptions   Medication Sig Dispense Refill   • Non Formulary Request Apply 1 Application to affected area(s) every day. DHEA/prog/test 10/50/3mg/gm apply 1 ml topically daily except for sundays. 1 Each 3   • atorvastatin (LIPITOR) 10 MG Tab Take 0.5 Tabs by mouth every 72 hours. 12 Tab 1   • colesevelam (WELCHOL) 625 MG Tab Take 3 Tabs by mouth every day. 270 Tab 2   • Latanoprost (XALATAN OP) by Ophthalmic route.     • Estradiol 0.025 MG/24HR PATCH BIWEEKLY Apply 1 Patch to skin as directed every 72 hours. 24 Patch 3   • doxycycline (ORACEA) 40 MG capsule Take 1 Cap by mouth every morning. 30 Cap 3   • loratadine (CLARITIN) 10 MG TABS Take 10 mg by mouth every day.    "    No current facility-administered medications for this visit.        Allergies   Allergen Reactions   • Augmentin    • Statins [Hmg-Coa-R Inhibitors]      myalgias       ROS  Constitutional: Negative. Negative for fever, chills, weight loss, malaise/fatigue and diaphoresis.   HENT: Negative. Negative for hearing loss, ear pain, nosebleeds, congestion, sore throat, neck pain, tinnitus and ear discharge.   Respiratory: Negative. Negative for cough, hemoptysis, sputum production, shortness of breath, wheezing and stridor.   Cardiovascular: Negative. Negative for chest pain, palpitations, orthopnea, claudication, leg swelling and PND.   Gastrointestinal: Denies nausea, vomiting, diarrhea, constipation, heartburn, melena or hematochezia.  Genitourinary: Denies dysuria, hematuria, urinary incontinence, frequency or urgency.        Objective:     Blood pressure 122/86, pulse 78, temperature 37.1 °C (98.8 °F), height 1.6 m (5' 3\"), weight 58.8 kg (129 lb 9.6 oz), SpO2 96 %. Body mass index is 22.96 kg/m².    Physical Exam:  Vitals reviewed.  Constitutional: Oriented to person, place, and time. appears well-developed and well-nourished. No distress.   Cardiovascular: Normal rate, regular rhythm, normal heart sounds and intact distal pulses. Exam reveals no gallop and no friction rub. No murmur heard. No carotid bruits.   Pulmonary/Chest: Effort normal and breath sounds normal. No stridor. No respiratory distress. no wheezes or rales. exhibits no tenderness.   Musculoskeletal: Normal range of motion. exhibits no edema. radha pedal pulses 2+.  Lymphadenopathy: No cervical or supraclavicular adenopathy.   Neurological: Alert and oriented to person, place, and time. exhibits normal muscle tone.  Skin: Skin is warm and dry. No diaphoresis.   Psychiatric: Normal mood and affect. Behavior is normal.      Assessment and Plan:     The following treatment plan was discussed:    1. Hyperlipidemia LDL goal <130  atorvastatin (LIPITOR) " 10 MG Tab    COMP METABOLIC PANEL    LIPID PROFILE    try 5 mg lipitor 2x/wk.  if s/e to this will not be able to do statins.  will retry bile acid sequstrans.    2. Postmenopausal HRT (hormone replacement therapy)  Non Formulary Request    DISCONTINUED: Non Formulary Request    stable on med. refill meds         Followup: Return in about 4 months (around 10/12/2018).

## 2018-07-06 ENCOUNTER — OFFICE VISIT (OUTPATIENT)
Dept: URGENT CARE | Facility: CLINIC | Age: 73
End: 2018-07-06
Payer: MEDICARE

## 2018-07-06 VITALS
OXYGEN SATURATION: 99 % | DIASTOLIC BLOOD PRESSURE: 62 MMHG | TEMPERATURE: 97.6 F | SYSTOLIC BLOOD PRESSURE: 126 MMHG | WEIGHT: 129 LBS | HEIGHT: 63 IN | HEART RATE: 68 BPM | RESPIRATION RATE: 16 BRPM | BODY MASS INDEX: 22.86 KG/M2

## 2018-07-06 DIAGNOSIS — J01.00 ACUTE NON-RECURRENT MAXILLARY SINUSITIS: ICD-10-CM

## 2018-07-06 PROCEDURE — 99204 OFFICE O/P NEW MOD 45 MIN: CPT | Performed by: PHYSICIAN ASSISTANT

## 2018-07-06 RX ORDER — DOXYCYCLINE HYCLATE 100 MG/1
100 CAPSULE ORAL 2 TIMES DAILY
Qty: 14 EACH | Refills: 0 | Status: SHIPPED | OUTPATIENT
Start: 2018-07-06 | End: 2018-07-13

## 2018-07-06 ASSESSMENT — ENCOUNTER SYMPTOMS
CHILLS: 0
SORE THROAT: 0
EYE PAIN: 0
FEVER: 0
WHEEZING: 0
EYE REDNESS: 0
EYE DISCHARGE: 0
DIZZINESS: 1
SINUS PRESSURE: 1
SHORTNESS OF BREATH: 0
PALPITATIONS: 0
COUGH: 0
SINUS PAIN: 1
HEADACHES: 1

## 2018-07-06 NOTE — PROGRESS NOTES
Subjective:      Stephania Galloway is a 72 y.o. female who presents with Nasal Congestion            Sinus Problem   This is a new problem. The current episode started 1 to 4 weeks ago. The problem has been gradually worsening since onset. Associated symptoms include congestion, ear pain, headaches, sinus pressure and sneezing. Pertinent negatives include no chills, coughing, shortness of breath or sore throat. Past treatments include oral decongestants. The treatment provided no relief.       Review of Systems   Constitutional: Positive for malaise/fatigue. Negative for chills and fever.   HENT: Positive for congestion, ear pain, sinus pain, sinus pressure and sneezing. Negative for sore throat.    Eyes: Negative for pain, discharge and redness.   Respiratory: Negative for cough, shortness of breath and wheezing.    Cardiovascular: Negative for chest pain and palpitations.   Neurological: Positive for dizziness and headaches.   All other systems reviewed and are negative.    PMH:  has a past medical history of Allergic rhinitis due to allergen; B12 deficiency; Glaucoma (increased eye pressure); History of melanoma; Hormone replacement therapy (postmenopausal); Hyperlipemia; Osteopenia; and Vitamin D deficiency disease. She also has no past medical history of Breast cancer (HCC).  MEDS:   Current Outpatient Prescriptions:   •  doxycycline (VIBRAMYCIN) 100 MG Cap, Take 1 Cap by mouth 2 times a day for 7 days., Disp: 14 Each, Rfl: 0  •  atorvastatin (LIPITOR) 10 MG Tab, Take 0.5 Tabs by mouth every 72 hours., Disp: 12 Tab, Rfl: 1  •  Estradiol 0.025 MG/24HR PATCH BIWEEKLY, Apply 1 Patch to skin as directed every 72 hours., Disp: 24 Patch, Rfl: 3  •  doxycycline (ORACEA) 40 MG capsule, Take 1 Cap by mouth every morning., Disp: 30 Cap, Rfl: 3  •  loratadine (CLARITIN) 10 MG TABS, Take 10 mg by mouth every day., Disp: , Rfl:   •  Non Formulary Request, Apply 1 Application to affected area(s) every day. DHEA/prog/test  "10/50/3mg/gm apply 1 ml topically daily except for sundays., Disp: 1 Each, Rfl: 3  •  colesevelam (WELCHOL) 625 MG Tab, Take 3 Tabs by mouth every day., Disp: 270 Tab, Rfl: 2  •  Latanoprost (XALATAN OP), by Ophthalmic route., Disp: , Rfl:   ALLERGIES:   Allergies   Allergen Reactions   • Augmentin    • Statins [Hmg-Coa-R Inhibitors]      myalgias     SURGHX: No past surgical history on file.  SOCHX:  reports that she has been smoking.  She has never used smokeless tobacco. She reports that she drinks alcohol. She reports that she does not use drugs.  FH: Family history was reviewed, no pertinent findings to report  Medications, Allergies, and current problem list reviewed today in Epic       Objective:     /62   Pulse 68   Temp 36.4 °C (97.6 °F)   Resp 16   Ht 1.6 m (5' 2.99\")   Wt 58.5 kg (129 lb)   SpO2 99%   Breastfeeding? No   BMI 22.86 kg/m²      Physical Exam   Constitutional: She is oriented to person, place, and time. She appears well-developed and well-nourished.  Non-toxic appearance. She does not have a sickly appearance. She does not appear ill. No distress.   HENT:   Head: Normocephalic and atraumatic.   Right Ear: Hearing, tympanic membrane, external ear and ear canal normal.   Left Ear: Hearing, tympanic membrane, external ear and ear canal normal.   Nose: Mucosal edema and rhinorrhea present. No sinus tenderness. No epistaxis. Right sinus exhibits maxillary sinus tenderness. Left sinus exhibits maxillary sinus tenderness.   Mouth/Throat: Uvula is midline, oropharynx is clear and moist and mucous membranes are normal.   Eyes: Conjunctivae and EOM are normal.   Neck: Normal range of motion. Neck supple.   Cardiovascular: Normal rate, regular rhythm, normal heart sounds and intact distal pulses.    Pulmonary/Chest: Effort normal and breath sounds normal.   Neurological: She is alert and oriented to person, place, and time.   Skin: Skin is warm and dry.   Psychiatric: She has a normal mood " and affect. Her behavior is normal. Judgment and thought content normal.   Vitals reviewed.              Assessment/Plan:     1. Acute non-recurrent maxillary sinusitis    - doxycycline (VIBRAMYCIN) 100 MG Cap; Take 1 Cap by mouth 2 times a day for 7 days.  Dispense: 14 Each; Refill: 0    Differential diagnosis, natural history, supportive care discussed. Follow-up with primary care provider within 7-10 days, emergency room precautions discussed.  Patient and/or family appears understanding of information.  Handout and review of patients diagnosis and treatment was discussed extensively.

## 2018-08-15 LAB
ALBUMIN SERPL-MCNC: 4 G/DL (ref 3.5–4.8)
ALBUMIN/GLOB SERPL: 1.5 {RATIO} (ref 1.2–2.2)
ALP SERPL-CCNC: 77 IU/L (ref 39–117)
ALT SERPL-CCNC: 11 IU/L (ref 0–32)
AST SERPL-CCNC: 15 IU/L (ref 0–40)
BILIRUB SERPL-MCNC: 0.5 MG/DL (ref 0–1.2)
BUN SERPL-MCNC: 17 MG/DL (ref 8–27)
BUN/CREAT SERPL: 24 (ref 12–28)
CALCIUM SERPL-MCNC: 9 MG/DL (ref 8.7–10.3)
CHLORIDE SERPL-SCNC: 103 MMOL/L (ref 96–106)
CHOLEST SERPL-MCNC: 214 MG/DL (ref 100–199)
CO2 SERPL-SCNC: 23 MMOL/L (ref 20–29)
CREAT SERPL-MCNC: 0.71 MG/DL (ref 0.57–1)
GLOBULIN SER CALC-MCNC: 2.6 G/DL (ref 1.5–4.5)
GLUCOSE SERPL-MCNC: 83 MG/DL (ref 65–99)
HDLC SERPL-MCNC: 82 MG/DL
IF AFRICAN AMERICAN  100797: 100 ML/MIN/1.73
IF NON AFRICAN AMER 100791: 87 ML/MIN/1.73
LABORATORY COMMENT REPORT: ABNORMAL
LDLC SERPL CALC-MCNC: 119 MG/DL (ref 0–99)
POTASSIUM SERPL-SCNC: 4.3 MMOL/L (ref 3.5–5.2)
PROT SERPL-MCNC: 6.6 G/DL (ref 6–8.5)
SODIUM SERPL-SCNC: 140 MMOL/L (ref 134–144)
TRIGL SERPL-MCNC: 65 MG/DL (ref 0–149)
VLDLC SERPL CALC-MCNC: 13 MG/DL (ref 5–40)

## 2018-09-04 ENCOUNTER — OFFICE VISIT (OUTPATIENT)
Dept: MEDICAL GROUP | Facility: MEDICAL CENTER | Age: 73
End: 2018-09-04
Payer: MEDICARE

## 2018-09-04 VITALS
TEMPERATURE: 98.1 F | BODY MASS INDEX: 22.68 KG/M2 | OXYGEN SATURATION: 97 % | DIASTOLIC BLOOD PRESSURE: 70 MMHG | SYSTOLIC BLOOD PRESSURE: 108 MMHG | HEART RATE: 69 BPM | WEIGHT: 128 LBS | HEIGHT: 63 IN

## 2018-09-04 DIAGNOSIS — E78.5 HYPERLIPIDEMIA LDL GOAL <130: ICD-10-CM

## 2018-09-04 PROCEDURE — 99214 OFFICE O/P EST MOD 30 MIN: CPT | Performed by: NURSE PRACTITIONER

## 2018-09-04 RX ORDER — ATORVASTATIN CALCIUM 10 MG/1
5 TABLET, FILM COATED ORAL
Qty: 36 TAB | Refills: 2 | Status: SHIPPED | OUTPATIENT
Start: 2018-09-04 | End: 2018-11-19 | Stop reason: SDUPTHER

## 2018-09-04 NOTE — PROGRESS NOTES
Subjective:     Stephania Galloway is a 72 y.o. female who presents with hyperlipidemia.    HPI:   Seen in f/u for hyperlipidemia.  She is doing well.  She was started on lipitor 0.5 tab of 10 mg recently.  Has a hx of not svitlana statins.  Doing well now on 1.5 tabs every72 hrs.    Reviewed lab with pt.  Her GFR, CMP is wnl  LP shows trg and HDL are still at goal.  LDL is down from 146 to 119.  Goal is <100.      Patient Active Problem List    Diagnosis Date Noted   • Glaucoma (increased eye pressure)    • Hyperlipidemia LDL goal <130 11/03/2016   • Vitamin D deficiency disease    • Preventative health care 10/17/2013   • Osteopenia 10/07/2012   • History of melanoma 10/07/2012   • Vitamin B12 deficiency 10/04/2012   • Postmenopausal HRT (hormone replacement therapy) 10/04/2012   • Seasonal allergies 10/04/2012       Current medicines (including changes today)  Current Outpatient Prescriptions   Medication Sig Dispense Refill   • atorvastatin (LIPITOR) 10 MG Tab Take 0.5 Tabs by mouth every 72 hours. 36 Tab 2   • Non Formulary Request Apply 1 Application to affected area(s) every day. DHEA/prog/test 10/50/3mg/gm apply 1 ml topically daily except for sundays. 1 Each 3   • Latanoprost (XALATAN OP) by Ophthalmic route.     • Estradiol 0.025 MG/24HR PATCH BIWEEKLY Apply 1 Patch to skin as directed every 72 hours. 24 Patch 3   • doxycycline (ORACEA) 40 MG capsule Take 1 Cap by mouth every morning. 30 Cap 3   • loratadine (CLARITIN) 10 MG TABS Take 10 mg by mouth every day.       No current facility-administered medications for this visit.        Allergies   Allergen Reactions   • Augmentin    • Statins [Hmg-Coa-R Inhibitors]      myalgias       ROS  Constitutional: Negative. Negative for fever, chills, weight loss, malaise/fatigue and diaphoresis.   HENT: Negative. Negative for hearing loss, ear pain, nosebleeds, congestion, sore throat, neck pain, tinnitus and ear discharge.   Respiratory: Negative. Negative for cough,  "hemoptysis, sputum production, shortness of breath, wheezing and stridor.   Cardiovascular: Negative. Negative for chest pain, palpitations, orthopnea, claudication, leg swelling and PND.   Gastrointestinal: Denies nausea, vomiting, diarrhea, constipation, heartburn, melena or hematochezia.  Genitourinary: Denies dysuria, hematuria, urinary incontinence, frequency or urgency.        Objective:     Blood pressure 108/70, pulse 69, temperature 36.7 °C (98.1 °F), height 1.6 m (5' 3\"), weight 58.1 kg (128 lb), SpO2 97 %, not currently breastfeeding. Body mass index is 22.67 kg/m².    Physical Exam:  Vitals reviewed.  Constitutional: Oriented to person, place, and time. appears well-developed and well-nourished. No distress.   Cardiovascular: Normal rate, regular rhythm, normal heart sounds and intact distal pulses. Exam reveals no gallop and no friction rub. No murmur heard. No carotid bruits.   Pulmonary/Chest: Effort normal and breath sounds normal. No stridor. No respiratory distress. no wheezes or rales. exhibits no tenderness.   Musculoskeletal: Normal range of motion. exhibits no edema. radha pedal pulses 2+.  Lymphadenopathy: No cervical or supraclavicular adenopathy.   Neurological: Alert and oriented to person, place, and time. exhibits normal muscle tone.  Skin: Skin is warm and dry. No diaphoresis.   Psychiatric: Normal mood and affect. Behavior is normal.      Assessment and Plan:     The following treatment plan was discussed:    1. Hyperlipidemia LDL goal <130  atorvastatin (LIPITOR) 10 MG Tab    stable on meds.  refilled lipitor.  f/u for HRA in 11/18.  then plan f/u yearly.          Followup: Return in about 2 months (around 11/4/2018).  "

## 2018-09-17 ENCOUNTER — OFFICE VISIT (OUTPATIENT)
Dept: MEDICAL GROUP | Facility: MEDICAL CENTER | Age: 73
End: 2018-09-17
Payer: MEDICARE

## 2018-09-17 VITALS
TEMPERATURE: 98.5 F | WEIGHT: 129.6 LBS | HEART RATE: 69 BPM | OXYGEN SATURATION: 94 % | HEIGHT: 63 IN | DIASTOLIC BLOOD PRESSURE: 60 MMHG | SYSTOLIC BLOOD PRESSURE: 100 MMHG | BODY MASS INDEX: 22.96 KG/M2

## 2018-09-17 DIAGNOSIS — R04.0 NOSEBLEED: ICD-10-CM

## 2018-09-17 DIAGNOSIS — Z23 NEED FOR SHINGLES VACCINE: ICD-10-CM

## 2018-09-17 DIAGNOSIS — J01.00 ACUTE NON-RECURRENT MAXILLARY SINUSITIS: ICD-10-CM

## 2018-09-17 PROCEDURE — 99214 OFFICE O/P EST MOD 30 MIN: CPT | Performed by: NURSE PRACTITIONER

## 2018-09-17 RX ORDER — DOXYCYCLINE HYCLATE 100 MG
100 TABLET ORAL 2 TIMES DAILY
Qty: 20 TAB | Refills: 0 | Status: SHIPPED | OUTPATIENT
Start: 2018-09-17 | End: 2018-11-19

## 2018-09-17 NOTE — PROGRESS NOTES
Subjective:     Stephania Galloway is a 73 y.o. female who presents with nosebleed.    HPI:   Seen in f/u for nose bleed.  Its been occurring in am daily for a week.  Using ponarix.  Had this several eyars ago that had to be cauterized.    Tried vaseline.    Now thinks that she is getting sinus infection with congestion.  havign green nasal secretions. + headache, sinus pressure and hear pressure.  No cough.  No sore throat.  No SOB or wheezing.    She is due shingrix.  Will get flu when well.    Patient Active Problem List    Diagnosis Date Noted   • Glaucoma (increased eye pressure)    • Hyperlipidemia LDL goal <130 11/03/2016   • Vitamin D deficiency disease    • Preventative health care 10/17/2013   • Osteopenia 10/07/2012   • History of melanoma 10/07/2012   • Vitamin B12 deficiency 10/04/2012   • Postmenopausal HRT (hormone replacement therapy) 10/04/2012   • Seasonal allergies 10/04/2012       Current medicines (including changes today)  Current Outpatient Prescriptions   Medication Sig Dispense Refill   • doxycycline (VIBRAMYCIN) 100 MG Tab Take 1 Tab by mouth 2 times a day. 20 Tab 0   • Zoster Vac Recomb Adjuvanted (SHINGRIX) 50 MCG Recon Susp 0.5 mL by Intramuscular route Once for 1 dose. 0.5 mL 0   • atorvastatin (LIPITOR) 10 MG Tab Take 0.5 Tabs by mouth every 72 hours. 36 Tab 2   • Non Formulary Request Apply 1 Application to affected area(s) every day. DHEA/prog/test 10/50/3mg/gm apply 1 ml topically daily except for sundays. 1 Each 3   • Latanoprost (XALATAN OP) by Ophthalmic route.     • Estradiol 0.025 MG/24HR PATCH BIWEEKLY Apply 1 Patch to skin as directed every 72 hours. 24 Patch 3   • doxycycline (ORACEA) 40 MG capsule Take 1 Cap by mouth every morning. 30 Cap 3   • loratadine (CLARITIN) 10 MG TABS Take 10 mg by mouth every day.       No current facility-administered medications for this visit.        Allergies   Allergen Reactions   • Augmentin    • Statins [Hmg-Coa-R Inhibitors]      myalgias  "      ROS  Constitutional: Negative. Negative for fever, chills, weight loss, malaise/fatigue and diaphoresis.   HENT: Negative. Negative for hearing loss, ear pain, sore throat, neck pain, tinnitus and ear discharge.   Respiratory: Negative. Negative for hemoptysis, sputum production, shortness of breath, wheezing and stridor.   Cardiovascular: Negative. Negative for chest pain, palpitations, orthopnea, claudication, leg swelling and PND.   Gastrointestinal: Denies nausea, vomiting, diarrhea, constipation, heartburn, melena or hematochezia.  Genitourinary: Denies dysuria, hematuria, urinary incontinence, frequency or urgency.        Objective:     Blood pressure 100/60, pulse 69, temperature 36.9 °C (98.5 °F), height 1.6 m (5' 3\"), weight 58.8 kg (129 lb 9.6 oz), SpO2 94 %. Body mass index is 22.96 kg/m².    Physical Exam:  Physical Exam   Vitals reviewed.  Constitutional: oriented to person, place, and time. appears well-developed and well-nourished. No distress.   HENT:  Head: Normocephalic and atraumatic. Right Ear: External ear normal. Left Ear: External ear normal. Nose: Nose normal. Mouth/Throat: Oropharynx is clear and moist. No oropharyngeal exudate.  radha tm wnl.  Eyes: Right eye exhibits no discharge. Left eye exhibits no discharge. No scleral icterus.  Neck: No JVD present. Rt nares with medial ulcer with small amt blood.  Nitrous oxide to area.  No further bleeding during appt.  Left nares wnl.   +  radha max sinus tenderness  Cardiovascular: Normal rate, regular rhythm, normal heart sounds and intact distal pulses.  Exam reveals no gallop and no friction rub.  No murmur heard.  No carotid bruits.   Pulmonary/Chest: Effort normal and breath sounds normal. No stridor. No respiratory distress. no wheezes or rales. exhibits no tenderness.   Musculoskeletal: Normal range of motion. Lymphadenopathy: no cervical or supraclavicular adenopathy.   Abd:  No CVAT,  Soft,  Bs noted in all quadrants.  No HSM.  No " abdominal tenderness.  Neurological: alert and oriented to person, place, and time. exhibits normal muscle tone. Coordination normal.   Skin: Skin is warm and dry. no diaphoresis.   Psychiatric: normal mood and affect. behavior is normal.        Assessment and Plan:     The following treatment plan was discussed:    1. Nosebleed      nitrous oxide to rt medial nares.  f/u if bleeding reoccurs.   2. Acute non-recurrent maxillary sinusitis  doxycycline (VIBRAMYCIN) 100 MG Tab    zyrtec annd doxycycline.  f/u if sx not improved with tx.    3. Need for shingles vaccine  Zoster Vac Recomb Adjuvanted (SHINGRIX) 50 MCG Recon Susp         Followup: Return if symptoms worsen or fail to improve.

## 2018-10-01 ENCOUNTER — APPOINTMENT (RX ONLY)
Dept: URBAN - METROPOLITAN AREA CLINIC 4 | Facility: CLINIC | Age: 73
Setting detail: DERMATOLOGY
End: 2018-10-01

## 2018-10-01 DIAGNOSIS — L82.1 OTHER SEBORRHEIC KERATOSIS: ICD-10-CM

## 2018-10-01 DIAGNOSIS — L72.0 EPIDERMAL CYST: ICD-10-CM

## 2018-10-01 DIAGNOSIS — L81.4 OTHER MELANIN HYPERPIGMENTATION: ICD-10-CM

## 2018-10-01 DIAGNOSIS — D22 MELANOCYTIC NEVI: ICD-10-CM

## 2018-10-01 DIAGNOSIS — Z85.820 PERSONAL HISTORY OF MALIGNANT MELANOMA OF SKIN: ICD-10-CM

## 2018-10-01 DIAGNOSIS — D18.0 HEMANGIOMA: ICD-10-CM

## 2018-10-01 PROBLEM — D23.9 OTHER BENIGN NEOPLASM OF SKIN, UNSPECIFIED: Status: ACTIVE | Noted: 2018-10-01

## 2018-10-01 PROBLEM — L57.0 ACTINIC KERATOSIS: Status: ACTIVE | Noted: 2018-10-01

## 2018-10-01 PROBLEM — D18.01 HEMANGIOMA OF SKIN AND SUBCUTANEOUS TISSUE: Status: ACTIVE | Noted: 2018-10-01

## 2018-10-01 PROBLEM — D22.71 MELANOCYTIC NEVI OF RIGHT LOWER LIMB, INCLUDING HIP: Status: ACTIVE | Noted: 2018-10-01

## 2018-10-01 PROBLEM — D22.72 MELANOCYTIC NEVI OF LEFT LOWER LIMB, INCLUDING HIP: Status: ACTIVE | Noted: 2018-10-01

## 2018-10-01 PROBLEM — D22.62 MELANOCYTIC NEVI OF LEFT UPPER LIMB, INCLUDING SHOULDER: Status: ACTIVE | Noted: 2018-10-01

## 2018-10-01 PROBLEM — D22.61 MELANOCYTIC NEVI OF RIGHT UPPER LIMB, INCLUDING SHOULDER: Status: ACTIVE | Noted: 2018-10-01

## 2018-10-01 PROBLEM — D22.5 MELANOCYTIC NEVI OF TRUNK: Status: ACTIVE | Noted: 2018-10-01

## 2018-10-01 PROCEDURE — ? COUNSELING

## 2018-10-01 PROCEDURE — ? SUNSCREEN RECOMMENDATIONS

## 2018-10-01 PROCEDURE — 99213 OFFICE O/P EST LOW 20 MIN: CPT

## 2018-10-01 ASSESSMENT — LOCATION DETAILED DESCRIPTION DERM
LOCATION DETAILED: RIGHT DISTAL POSTERIOR UPPER ARM
LOCATION DETAILED: LEFT ULNAR DORSAL HAND
LOCATION DETAILED: LEFT CENTRAL MALAR CHEEK
LOCATION DETAILED: RIGHT CENTRAL MALAR CHEEK
LOCATION DETAILED: LEFT PROXIMAL DORSAL FOREARM
LOCATION DETAILED: RIGHT ANTERIOR PROXIMAL THIGH
LOCATION DETAILED: LEFT PROXIMAL POSTERIOR UPPER ARM
LOCATION DETAILED: LEFT SUPERIOR MEDIAL UPPER BACK
LOCATION DETAILED: PERIUMBILICAL SKIN
LOCATION DETAILED: RIGHT SUPERIOR MEDIAL MIDBACK
LOCATION DETAILED: RIGHT RADIAL DORSAL HAND
LOCATION DETAILED: SUPERIOR THORACIC SPINE
LOCATION DETAILED: LEFT INFERIOR ANTERIOR NECK
LOCATION DETAILED: RIGHT PROXIMAL DORSAL FOREARM
LOCATION DETAILED: RIGHT RIB CAGE
LOCATION DETAILED: LEFT ANTERIOR PROXIMAL THIGH

## 2018-10-01 ASSESSMENT — LOCATION SIMPLE DESCRIPTION DERM
LOCATION SIMPLE: LEFT UPPER BACK
LOCATION SIMPLE: RIGHT CHEEK
LOCATION SIMPLE: RIGHT FOREARM
LOCATION SIMPLE: RIGHT THIGH
LOCATION SIMPLE: LEFT POSTERIOR UPPER ARM
LOCATION SIMPLE: UPPER BACK
LOCATION SIMPLE: ABDOMEN
LOCATION SIMPLE: RIGHT LOWER BACK
LOCATION SIMPLE: RIGHT POSTERIOR UPPER ARM
LOCATION SIMPLE: LEFT CHEEK
LOCATION SIMPLE: RIGHT HAND
LOCATION SIMPLE: LEFT THIGH
LOCATION SIMPLE: LEFT FOREARM
LOCATION SIMPLE: LEFT HAND
LOCATION SIMPLE: LEFT ANTERIOR NECK

## 2018-10-01 ASSESSMENT — LOCATION ZONE DERM
LOCATION ZONE: ARM
LOCATION ZONE: NECK
LOCATION ZONE: HAND
LOCATION ZONE: LEG
LOCATION ZONE: TRUNK
LOCATION ZONE: FACE

## 2018-10-04 RX ORDER — ESTRADIOL 0.03 MG/D
1 FILM, EXTENDED RELEASE TRANSDERMAL
Qty: 24 PATCH | Refills: 3 | Status: SHIPPED | OUTPATIENT
Start: 2018-10-04 | End: 2018-11-19 | Stop reason: SDUPTHER

## 2018-11-12 ENCOUNTER — HOSPITAL ENCOUNTER (OUTPATIENT)
Dept: RADIOLOGY | Facility: MEDICAL CENTER | Age: 73
End: 2018-11-12
Attending: NURSE PRACTITIONER
Payer: MEDICARE

## 2018-11-12 DIAGNOSIS — Z12.31 VISIT FOR SCREENING MAMMOGRAM: ICD-10-CM

## 2018-11-12 PROCEDURE — 77067 SCR MAMMO BI INCL CAD: CPT

## 2018-11-15 ENCOUNTER — TELEPHONE (OUTPATIENT)
Dept: MEDICAL GROUP | Facility: MEDICAL CENTER | Age: 73
End: 2018-11-15

## 2018-11-16 ENCOUNTER — TELEPHONE (OUTPATIENT)
Dept: MEDICAL GROUP | Facility: MEDICAL CENTER | Age: 73
End: 2018-11-16

## 2018-11-16 NOTE — TELEPHONE ENCOUNTER
ESTABLISHED PATIENT PRE-VISIT PLANNING     Patient was NOT contacted to complete PVP.     Note: Patient will not be contacted if there is no indication to call.     1.  Reviewed notes from the last few office visits within the medical group: Yes 9/17/18, 9/4/18    2.  If any orders were placed at last visit or intended to be done for this visit (i.e. 6 mos follow-up), do we have Results/Consult Notes?        •  Labs - Labs were not ordered at last office visit.   Note: If patient appointment is for lab review and patient did not complete labs, check with provider if OK to reschedule patient until labs completed.       •  Imaging - Imaging was not ordered at last office visit.       •  Referrals - No referrals were ordered at last office visit.    3. Is this appointment scheduled as a Hospital Follow-Up? No    4.  Immunizations were updated in Epic using WebIZ?: Yes       •  Web Iz Recommendations: TD and ZOSTAVAX (Shingles)    5.  Patient is due for the following Health Maintenance Topics:   Health Maintenance Due   Topic Date Due   • IMM ZOSTER VACCINES (2 of 3) 03/29/2007   • BONE DENSITY  11/04/2014   • Annual Wellness Visit  11/09/2018     6.  MDX printed for Provider? NO    7.  Patient was NOT informed to arrive 15 min prior to their scheduled appointment and bring in their medication bottles.

## 2018-11-16 NOTE — TELEPHONE ENCOUNTER
Future Appointments       Provider Department Center    11/19/2018 10:00 AM DANIEL Ogden; [facility]          ANNUAL WELLNESS VISIT PRE-VISIT PLANNING WITHOUT OUTREACH    1.  Reviewed note from last office visit with PCP: YES    2.  If any orders were placed at last visit, do we have Results/Consult Notes?        •  Labs - Labs ordered, completed on 8/15/18 and results are in chart.  Note: If patient appointment is for lab review and patient did not complete labs, check with provider if OK to reschedule patient until labs completed.       •  Imaging - Imaging was not ordered at last office visit.       •  Referrals - No referrals were ordered at last office visit.    3.  Immunizations were updated in [employer] using WebIZ?: Yes       •  WebIZ Recommendations: SHINGRIX (Shingles)        •  Is patient due for Tdap? NO       •  Is patient due for Shingles? YES. Patient was notified of copay/out of pocket cost.     4.  Patient is due for the following Health Maintenance Topics:   Health Maintenance Due   Topic Date Due   • IMM ZOSTER VACCINES (2 of 3) 03/29/2007   • BONE DENSITY  11/04/2014   • Annual Wellness Visit  11/09/2018       - Patient declines bone density scan    5.  Reviewed/Updated the following with patient:       •   Preferred Pharmacy? YES       •   Preferred Lab? YES       •   Preferred Communication? YES       •   Allergies? YES       •   Medications? YES. Was Abstract Encounter opened and chart updated? YES       •   Social History? YES. Was Abstract Encounter opened and chart updated? YES       •   Family History (document living status of immediate family members and if + hx of  cancer, diabetes, hypertension, hyperlipidemia, heart attack, stroke) YES. Was Abstract Encounter opened and chart updated? YES    6.  Care Team Updated:       •   DME Company (gait device, O2, CPAP, etc.): N\A       •   Other Specialists (eye doctor, derm, GYN,  cardiology, endo, etc): YES    7.  Patient has the following Care Path diagnoses on Problem List:  NONE    8.  Patient was advised: “This is a free wellness visit. The provider will screen for medical conditions to help you stay healthy. If you have other concerns to address you may be asked to discuss these at a separate visit or there may be an additional fee.”     9.  Patient was informed to arrive 15 min prior to their scheduled appointment and bring in their medication bottles.

## 2018-11-19 ENCOUNTER — OFFICE VISIT (OUTPATIENT)
Dept: MEDICAL GROUP | Facility: MEDICAL CENTER | Age: 73
End: 2018-11-19
Payer: MEDICARE

## 2018-11-19 VITALS
SYSTOLIC BLOOD PRESSURE: 118 MMHG | BODY MASS INDEX: 23.21 KG/M2 | OXYGEN SATURATION: 97 % | WEIGHT: 131 LBS | TEMPERATURE: 98.6 F | HEART RATE: 80 BPM | DIASTOLIC BLOOD PRESSURE: 68 MMHG | HEIGHT: 63 IN

## 2018-11-19 DIAGNOSIS — M85.80 OSTEOPENIA, UNSPECIFIED LOCATION: ICD-10-CM

## 2018-11-19 DIAGNOSIS — E78.5 HYPERLIPIDEMIA LDL GOAL <130: ICD-10-CM

## 2018-11-19 DIAGNOSIS — E55.9 VITAMIN D DEFICIENCY DISEASE: ICD-10-CM

## 2018-11-19 DIAGNOSIS — Z85.820 HISTORY OF MELANOMA: ICD-10-CM

## 2018-11-19 DIAGNOSIS — Z79.890 POSTMENOPAUSAL HRT (HORMONE REPLACEMENT THERAPY): ICD-10-CM

## 2018-11-19 DIAGNOSIS — E53.8 VITAMIN B12 DEFICIENCY: ICD-10-CM

## 2018-11-19 DIAGNOSIS — H40.1211 LOW TENSION GLAUCOMA OF RIGHT EYE, MILD STAGE: ICD-10-CM

## 2018-11-19 DIAGNOSIS — R04.0 NOSEBLEED: ICD-10-CM

## 2018-11-19 DIAGNOSIS — J30.2 SEASONAL ALLERGIES: ICD-10-CM

## 2018-11-19 PROCEDURE — G0439 PPPS, SUBSEQ VISIT: HCPCS | Performed by: NURSE PRACTITIONER

## 2018-11-19 PROCEDURE — 99213 OFFICE O/P EST LOW 20 MIN: CPT | Mod: 25 | Performed by: NURSE PRACTITIONER

## 2018-11-19 RX ORDER — ATORVASTATIN CALCIUM 10 MG/1
5 TABLET, FILM COATED ORAL
Qty: 36 TAB | Refills: 2 | Status: SHIPPED | OUTPATIENT
Start: 2018-11-19 | End: 2019-03-10 | Stop reason: SDUPTHER

## 2018-11-19 RX ORDER — ESTRADIOL 0.03 MG/D
1 FILM, EXTENDED RELEASE TRANSDERMAL
Qty: 24 PATCH | Refills: 3 | Status: SHIPPED | OUTPATIENT
Start: 2018-11-19 | End: 2019-12-03 | Stop reason: SDUPTHER

## 2018-11-19 ASSESSMENT — PATIENT HEALTH QUESTIONNAIRE - PHQ9: CLINICAL INTERPRETATION OF PHQ2 SCORE: 0

## 2018-11-19 ASSESSMENT — ENCOUNTER SYMPTOMS: GENERAL WELL-BEING: EXCELLENT

## 2018-11-19 ASSESSMENT — PAIN SCALES - GENERAL: PAINLEVEL: 2=MINIMAL-SLIGHT

## 2018-11-19 ASSESSMENT — ACTIVITIES OF DAILY LIVING (ADL): BATHING_REQUIRES_ASSISTANCE: 0

## 2018-11-19 NOTE — ASSESSMENT & PLAN NOTE
Has sx all year long.  Taking zyrtec with some control.  No current sx of infection.  However she is still having chronic nosebleeds.  Was treated with silver nitrate at last appt.  Still having nosebleeds in am that are only sl better.

## 2018-11-19 NOTE — PROGRESS NOTES
Chief Complaint   Patient presents with   • Annual Wellness Visit         HPI:  Stephania is a 73 y.o. here for Medicare Annual Wellness Visit    Seen in f/u for HRA.  She is feeling well.  Needs refills on meds.     Vitamin D deficiency disease  Stable on otc meds.  Last d in 3/18 was wnl    Vitamin B12 deficiency  Stable on otc supplement.  b12 in 3/18 wasw nl    Seasonal allergies  Has sx all year long.  Taking zyrtec with some control.  No current sx of infection.  However she is still having chronic nosebleeds.  Was treated with silver nitrate at last appt.  Still having nosebleeds in am that are only sl better.      Postmenopausal HRT (hormone replacement therapy)  Stable on meds. n eeds refill    Osteopenia  She declines tx or repeat dexa.  No hx of fx.  On otc d3.  Does occas exercise.     Hyperlipidemia LDL goal <130  She is on lipitor 3x/wk.  svitlana well.  LDL is down from 164 to 119. Plan repeat lab yearly.  No s/e to meds    History of melanoma  Followed by derm.  She just saw them and there are no concerns.     Glaucoma (increased eye pressure)  She is followed by dr siddiqi.  Stable on meds.  Vision intact.  Her recent jpressures were normal.          Patient Active Problem List    Diagnosis Date Noted   • Glaucoma (increased eye pressure)    • Hyperlipidemia LDL goal <130 11/03/2016   • Vitamin D deficiency disease    • Preventative health care 10/17/2013   • Osteopenia 10/07/2012   • History of melanoma 10/07/2012   • Vitamin B12 deficiency 10/04/2012   • Postmenopausal HRT (hormone replacement therapy) 10/04/2012   • Seasonal allergies 10/04/2012       Current Outpatient Prescriptions   Medication Sig Dispense Refill   • Estradiol 0.025 MG/24HR PATCH BIWEEKLY APPLY 1 PATCH TO SKIN AS DIRECTED EVERY 72 HOURS. 24 Patch 3   • doxycycline (VIBRAMYCIN) 100 MG Tab Take 1 Tab by mouth 2 times a day. (Patient not taking: Reported on 11/16/2018) 20 Tab 0   • atorvastatin (LIPITOR) 10 MG Tab Take 0.5 Tabs by mouth  every 72 hours. 36 Tab 2   • Non Formulary Request Apply 1 Application to affected area(s) every day. DHEA/prog/test 10/50/3mg/gm apply 1 ml topically daily except for sundays. 1 Each 3   • Latanoprost (XALATAN OP) by Ophthalmic route.     • doxycycline (ORACEA) 40 MG capsule Take 1 Cap by mouth every morning. 30 Cap 3   • loratadine (CLARITIN) 10 MG TABS Take 10 mg by mouth every day.       No current facility-administered medications for this visit.         Patient is taking medications as noted in medication list.  Current supplements as per medication list.     Allergies: Augmentin and Statins [hmg-coa-r inhibitors]    Current social contact/activities: Pt plays golf. Pt is invited to social functions. Pt goes to the [employer] occ. Pt spends time with family often.     Is patient current with immunizations? No, due for SHINGRIX (Shingles). Patient is interested in receiving NONE today.    She  reports that she has been smoking.  She has never used smokeless tobacco. She reports that she drinks alcohol. She reports that she does not use drugs.  Ready to quit: Not Answered  Counseling given: Not Answered        DPA/Advanced directive: Patient has Living Will on file.     ROS:    Gait: Uses no assistive device   Ostomy: No   Other tubes: No   Amputations: No   Chronic oxygen use No   Last eye exam 2 weeks ago   Wears hearing aids: No   : Denies any urinary leakage during the last 6 months  Review of Systems   Constitutional: Negative.  Negative for fever, chills, weight loss, malaise/fatigue and diaphoresis.   HENT: Negative.  Negative for hearing loss, ear pain, congestion, sore throat, neck pain, tinnitus and ear discharge.    Eyes: Negative.  Negative for blurred vision, double vision, photophobia, pain, discharge and redness.   Respiratory: Negative.  Negative for cough, hemoptysis, sputum production, shortness of breath, wheezing and stridor.    Cardiovascular: Negative.  Negative for chest pain, palpitations,  orthopnea, claudication, leg swelling and PND.   Gastrointestinal: Negative.  Negative for heartburn, nausea, vomiting, abdominal pain, diarrhea, constipation, blood in stool and melena.   Genitourinary: Negative.  Negative for dysuria, urgency, frequency, incontinence, hematuria and flank pain.   Musculoskeletal: Negative.  Negative for myalgias, back pain and falls.   Skin: Negative.  Negative for itching and rash.   Neurological: Negative.  Negative for dizziness, tingling, tremors, sensory change, speech change, focal weakness, seizures, loss of consciousness, weakness and headaches.   Endo/Heme/Allergies: Negative.  Negative for environmental allergies and polydipsia. Does not bruise/bleed easily.   Psychiatric/Behavioral: Negative.  Negative for depression, suicidal ideas, hallucinations, memory loss and substance abuse. The patient is not nervous/anxious and does not have insomnia.    All other systems reviewed and are negative.      Depression Screening    Little interest or pleasure in doing things?  0 - not at all  Feeling down, depressed, or hopeless? 0 - not at all  Patient Health Questionnaire Score: 0    If depressive symptoms identified deferred to follow up visit unless specifically addressed in assessment and plan.    Interpretation of PHQ-9 Total Score   Score Severity   1-4 No Depression   5-9 Mild Depression   10-14 Moderate Depression   15-19 Moderately Severe Depression   20-27 Severe Depression    Screening for Cognitive Impairment    Three Minute Recall (leader, season, table)  1/3    Curtis clock face with all 12 numbers and set the hands to show 10 past 11.  Yes 5/5  If cognitive concerns identified, deferred for follow up unless specifically addressed in assessment and plan.    Fall Risk Assessment    Has the patient had two or more falls in the last year or any fall with injury in the last year?  No  If fall risk identified, deferred for follow up unless specifically addressed in assessment  and plan.    Safety Assessment    Throw rugs on floor.  No  Handrails on all stairs.  No  Good lighting in all hallways.  Yes  Difficulty hearing.  No  Patient counseled about all safety risks that were identified.    Functional Assessment ADLs    Are there any barriers preventing you from cooking for yourself or meeting nutritional needs?  No.    Are there any barriers preventing you from driving safely or obtaining transportation?  No.    Are there any barriers preventing you from using a telephone or calling for help?  No.    Are there any barriers preventing you from shopping?  No.    Are there any barriers preventing you from taking care of your own finances?  No.    Are there any barriers preventing you from managing your medications?  No.    Are there any barriers preventing you from showering, bathing or dressing yourself?  No.    Are you currently engaging in any exercise or physical activity?  Yes.  Pt plays golf and cleans house  What is your perception of your health?  Excellent.    Health Maintenance Summary                IMM ZOSTER VACCINES Overdue 3/29/2007      Done 2/1/2007 Imm Admin: Zoster Vaccine Live (ZVL) (Zostavax)    BONE DENSITY Overdue 11/4/2014      Done 11/4/2009 L +1.2, H +1.0    Annual Wellness Visit Overdue 11/9/2018      Done 11/8/2017     MAMMOGRAM Next Due 11/12/2019      Done 11/12/2018 MA-SCREENING MAMMO BILAT W/TOMOSYNTHESIS W/CAD     Patient has more history with this topic...    COLONOSCOPY Next Due 12/19/2019      Done 12/19/2014     IMM DTaP/Tdap/Td Vaccine Next Due 11/8/2027      Done 11/8/2017 Imm Admin: Tdap Vaccine          Patient Care Team:  DANIEL Ogden as PCP - General (Family Medicine)  Vitaly Alvarado O.D. as Consulting Physician (Optometry)    Social History   Substance Use Topics   • Smoking status: Current Some Day Smoker   • Smokeless tobacco: Never Used      Comment: only smokes socially   • Alcohol use 0.0 oz/week     Family History   Problem  "Relation Age of Onset   • Cancer Father         colon cancer dx at age ?   • Cancer Paternal Grandfather 80        colon cancer    • Heart Disease Mother    • Stroke Mother    • Cancer Maternal Grandmother         breast     She  has a past medical history of Allergic rhinitis due to allergen; B12 deficiency; Glaucoma (increased eye pressure); History of melanoma; Hormone replacement therapy (postmenopausal); Hyperlipemia; Osteopenia; and Vitamin D deficiency disease.   History reviewed. No pertinent surgical history.        Exam:     Blood pressure 118/68, pulse 80, temperature 37 °C (98.6 °F), temperature source Temporal, height 1.6 m (5' 3\"), weight 59.4 kg (131 lb), SpO2 97 %, not currently breastfeeding. Body mass index is 23.21 kg/m².    Hearing excellent.    Dentition good  Alert, oriented in no acute distress.  Eye contact is good, speech goal directed, affect calm  Physical Exam   Vitals reviewed.  Constitutional: oriented to person, place, and time. appears well-developed and well-nourished. No distress.   HENT: Head: Normocephalic and atraumatic. Bilateral tympanic membranes wnl w/o bulging.  Right Ear: External ear normal. Left Ear: External ear normal. Nose: Nose normal.  Mouth/Throat: Oropharynx is clear and moist. No oropharyngeal exudate. radha tm wnl. Eyes: Conjunctivae and EOM are normal. Pupils are equal, round, and reactive to light. Right eye exhibits no discharge. Left eye exhibits no discharge. No scleral icterus.  radha nasal turbinates with erosions.  Very small amt bleeding noted to left nares.  Treated radha with nitrate stick.    Neck: Normal range of motion. Neck supple. No JVD present.   Cardiovascular: Normal rate, regular rhythm, normal heart sounds and intact distal pulses.  Exam reveals no gallop and no friction rub.  No murmur heard.  No carotid bruits   Pulmonary/Chest: Effort normal and breath sounds normal. No stridor. No respiratory distress. no wheezes or rales. exhibits no " tenderness.   Abdominal: Soft. Bowel sounds are normal. exhibits no distension and no mass. No tenderness. no rebound and no guarding.   Musculoskeletal: Normal range of motion. exhibits no edema or tenderness.  radha pedal pulses 2+.  Lymphadenopathy:  no cervical or supraclavicular adenopathy.   Neurological: alert and oriented to person, place, and time. has normal reflexes. displays normal reflexes. No cranial nerve deficit. exhibits normal muscle tone. Coordination normal.   Skin: Skin is warm and dry. No rash noted. no diaphoresis. No erythema. No pallor.   Psychiatric: normal mood and affect. behavior is normal.         Assessment and Plan. The following treatment and monitoring plan is recommended:   1. Hyperlipidemia LDL goal <130  Annual Wellness Visit - Includes PPPS Subsequent ()    atorvastatin (LIPITOR) 10 MG Tab    stable on meds.  refilled lipitor.   plan f/u yearly.   2. Postmenopausal HRT (hormone replacement therapy)  Annual Wellness Visit - Includes PPPS Subsequent ()    Estradiol 0.025 MG/24HR PATCH BIWEEKLY    Non Formulary Request    stable on med. refill meds   3. Vitamin D deficiency disease  Annual Wellness Visit - Includes PPPS Subsequent ()    stable on otc meds   4. Vitamin B12 deficiency  Annual Wellness Visit - Includes PPPS Subsequent ()    stable on otc meds   5. Seasonal allergies  Annual Wellness Visit - Includes PPPS Subsequent ()    chronic sx controlled with otc meds   6. Osteopenia, unspecified location  Annual Wellness Visit - Includes PPPS Subsequent ()    declines dexa.  not on ca but takes d3.  no routine exericise.   7. History of melanoma  Annual Wellness Visit - Includes PPPS Subsequent ()    followed by derm.  no current concerns   8. Low tension glaucoma of right eye, mild stage  Annual Wellness Visit - Includes PPPS Subsequent ()    stable on meds. followed by ophth     9.  Nosebleeds - treated ardha nares wtih silver nitrate.  No  bleeding noted at this time.  If sx reoccur she will call for ENT referral      Services suggested: No services needed at this time  Health Care Screening recommendations as per orders if indicated.  Referrals offered: PT/OT/Nutrition counseling/Behavioral Health/Smoking cessation as per orders if indicated.    Discussion today about general wellness and lifestyle habits:    · Prevent falls and reduce trip hazards; Cautioned about securing or removing rugs.  · Have a working fire alarm and carbon monoxide detector;   · Engage in regular physical activity and social activities       Follow-up:  Yearly. Call for lab slip

## 2018-11-19 NOTE — ASSESSMENT & PLAN NOTE
She is followed by dr siddiiq.  Stable on meds.  Vision intact.  Her recent jpressures were normal.

## 2018-11-19 NOTE — ASSESSMENT & PLAN NOTE
She is on lipitor 3x/wk.  svitlana well.  LDL is down from 164 to 119. Plan repeat lab yearly.  No s/e to meds

## 2019-09-28 NOTE — TELEPHONE ENCOUNTER
Patient:   KYLE GARCIA            MRN: [id number]            FIN: [id number]               Age:   20 hours     Sex:  MALE     :  19   Associated Diagnoses:   None   Author:   GEM MACKEY      History of Present Illness   Baby Boy born at 38+4/7 weeks via  / failure to progress following IOL for pre-eclampsia with severe features (BP) to a 19 y/o  mother with serologies (-), GBS (-). Mom: O+ Baby: O-. APGARS 5/9, requiring PPV at birth.    Maternal Hx:   - PMH: None  - Meds: PNV  - Denies tobacco/etoh/illicits  - SROM 13.5h prior to delivery    Seen and examined at bedside. Mother is breastfeeding without complications. Appropriate stool/wet diapers.      Physical Examination   VS/Measurements        Vitals between:   2017 08:13:44   TO   2017 08:13:44                   LAST RESULT MINIMUM MAXIMUM  Temperature 36.8 36.5 37  Heart Rate 140 120 140  Respiratory Rate 44 31 64  SpO2                    100 98 100     General:  No acute distress.    Eye:  Pupils are equal, round and reactive to light, Extraocular movements are intact, Normal conjunctiva, Red reflex bilaterally.    HENT:  Normocephalic, Palate intact, Oral mucosa is moist, Anterior fontanelle open/soft/flat.    Neck:  Supple, Non-tender.    Respiratory:  Lungs are clear to auscultation, Respirations are non-labored.    Cardiovascular:  Normal rate, Regular rhythm, Good pulses equal in all extremities, Normal peripheral perfusion, No edema.    Gastrointestinal:  Soft, Non-tender, Non-distended, No organomegaly.    Genitourinary:  Normal genitalia for age and sex.    Musculoskeletal     Normal range of motion.     No hip clicks.     Normal Pascual's.     Normal Ortolani's.     Integumentary:  Warm, Dry, Intact, No pallor, No rash.    Neurologic:  Moves all extremities appropriately, No focal deficits, No sacral dimple.       Impression and Plan   38+4 wga baby boy born at 11:53 on 3/17 via  /2 failure  Pt insurance requires 90 day please   to progress following IOL for pre-eclampsia with severe features (BP) with apgars 5/9. Complications none, progressing as expected    Routine  care  Anticipatory guidance    FEN:  Breast feeding q 2-3 hours, lactation consultation  Formula feeding monitor voiding and stooling.    Weight at birth: 3300g, today's weight 3315g (+0.5%), will monitor  Birth length: 54.6 cm  Birth head circumference: 34 cm    RESP:  Pre and post ductal sats @ 24hr: _  on RA    HEME:  24 hour Bili _    Labs:  Alviso screen @ 24 hours of life    Meds:  Given erythromycin and vitamin K    Imms:  Hep B prior to discharge    Hearing:  ABR prior to discharge    Peds Appt:  [facility]ediatrician: [facility]  Alviso visit: _    Dispo: anticipated d/c 3/20  Discussed with attending, Dr. Dillon Iraheta, PGY-1              Electronically Signed On 2017 13:53  __________________________________________________   GEM MACKEY      Electronically Signed On 2017 14:29  __________________________________________________   MELCHOR GRANDE

## 2019-10-02 ENCOUNTER — PATIENT OUTREACH (OUTPATIENT)
Dept: HEALTH INFORMATION MANAGEMENT | Facility: OTHER | Age: 74
End: 2019-10-02

## 2019-10-02 NOTE — PROGRESS NOTES
1. Attempt #:1    2. HealthConnect Verified: no    3. Verify PCP: yes    4. Review Care Team: yes    6. Reviewed/Updated the following with patient:       •   Communication Preference Obtained? No       •   Preferred Pharmacy? YES       •   Preferred Lab? YES       •   Family History (document living status of immediate family members and if + hx of cancer, diabetes, hypertension, hyperlipidemia, heart attack, stroke) YES    7. Annual Wellness Visit Scheduling  · Scheduling Status:Scheduled     8. Care Gap Scheduling (Attempt to Schedule EACH Overdue Care Gap!)     Health Maintenance Due   Topic Date Due   • HEPATITIS C SCREENING  1945   • IMM ZOSTER VACCINES (2 of 3) 03/29/2007   • BONE DENSITY  11/04/2014        Scheduled patient for Annual Wellness Visit  Patient declined Dexa Scan.     9. [employer] Activation: already active    10. [employer] Garth: no    11. Virtual Visits: no    12. Opt In to Text Messages: no    13. Patient was advised: “This is a free wellness visit. The provider will screen for medical conditions to help you stay healthy. If you have other concerns to address you may be asked to discuss these at a separate visit or there may be an additional fee.”     14. Patient was informed to arrive 15 min prior to their scheduled appointment and bring in their medication bottles.

## 2019-10-14 ENCOUNTER — APPOINTMENT (RX ONLY)
Dept: URBAN - METROPOLITAN AREA CLINIC 4 | Facility: CLINIC | Age: 74
Setting detail: DERMATOLOGY
End: 2019-10-14

## 2019-10-14 DIAGNOSIS — D22 MELANOCYTIC NEVI: ICD-10-CM

## 2019-10-14 DIAGNOSIS — Z85.820 PERSONAL HISTORY OF MALIGNANT MELANOMA OF SKIN: ICD-10-CM

## 2019-10-14 DIAGNOSIS — D17 BENIGN LIPOMATOUS NEOPLASM: ICD-10-CM

## 2019-10-14 DIAGNOSIS — L82.1 OTHER SEBORRHEIC KERATOSIS: ICD-10-CM

## 2019-10-14 DIAGNOSIS — D18.0 HEMANGIOMA: ICD-10-CM

## 2019-10-14 DIAGNOSIS — L85.3 XEROSIS CUTIS: ICD-10-CM

## 2019-10-14 DIAGNOSIS — L81.4 OTHER MELANIN HYPERPIGMENTATION: ICD-10-CM

## 2019-10-14 PROBLEM — D22.72 MELANOCYTIC NEVI OF LEFT LOWER LIMB, INCLUDING HIP: Status: ACTIVE | Noted: 2019-10-14

## 2019-10-14 PROBLEM — D17.22 BENIGN LIPOMATOUS NEOPLASM OF SKIN AND SUBCUTANEOUS TISSUE OF LEFT ARM: Status: ACTIVE | Noted: 2019-10-14

## 2019-10-14 PROBLEM — D22.5 MELANOCYTIC NEVI OF TRUNK: Status: ACTIVE | Noted: 2019-10-14

## 2019-10-14 PROBLEM — D22.71 MELANOCYTIC NEVI OF RIGHT LOWER LIMB, INCLUDING HIP: Status: ACTIVE | Noted: 2019-10-14

## 2019-10-14 PROBLEM — D17.21 BENIGN LIPOMATOUS NEOPLASM OF SKIN AND SUBCUTANEOUS TISSUE OF RIGHT ARM: Status: ACTIVE | Noted: 2019-10-14

## 2019-10-14 PROBLEM — D22.62 MELANOCYTIC NEVI OF LEFT UPPER LIMB, INCLUDING SHOULDER: Status: ACTIVE | Noted: 2019-10-14

## 2019-10-14 PROBLEM — D18.01 HEMANGIOMA OF SKIN AND SUBCUTANEOUS TISSUE: Status: ACTIVE | Noted: 2019-10-14

## 2019-10-14 PROBLEM — D22.61 MELANOCYTIC NEVI OF RIGHT UPPER LIMB, INCLUDING SHOULDER: Status: ACTIVE | Noted: 2019-10-14

## 2019-10-14 PROCEDURE — ? PATIENT SPECIFIC COUNSELING

## 2019-10-14 PROCEDURE — ? COUNSELING

## 2019-10-14 PROCEDURE — ? SUNSCREEN RECOMMENDATIONS

## 2019-10-14 PROCEDURE — 99214 OFFICE O/P EST MOD 30 MIN: CPT

## 2019-10-14 ASSESSMENT — LOCATION SIMPLE DESCRIPTION DERM
LOCATION SIMPLE: ABDOMEN
LOCATION SIMPLE: RIGHT LOWER BACK
LOCATION SIMPLE: LEFT POSTERIOR UPPER ARM
LOCATION SIMPLE: LEFT CHEEK
LOCATION SIMPLE: LEFT FOREARM
LOCATION SIMPLE: LEFT ANTERIOR NECK
LOCATION SIMPLE: UPPER BACK
LOCATION SIMPLE: RIGHT UPPER BACK
LOCATION SIMPLE: RIGHT CHEEK
LOCATION SIMPLE: LEFT THIGH
LOCATION SIMPLE: RIGHT HEEL
LOCATION SIMPLE: LEFT HEEL
LOCATION SIMPLE: RIGHT HAND
LOCATION SIMPLE: LEFT HAND
LOCATION SIMPLE: CHEST
LOCATION SIMPLE: RIGHT FOREARM
LOCATION SIMPLE: RIGHT THIGH
LOCATION SIMPLE: RIGHT POSTERIOR UPPER ARM
LOCATION SIMPLE: LEFT UPPER BACK

## 2019-10-14 ASSESSMENT — LOCATION ZONE DERM
LOCATION ZONE: NECK
LOCATION ZONE: TRUNK
LOCATION ZONE: ARM
LOCATION ZONE: FACE
LOCATION ZONE: FEET
LOCATION ZONE: LEG
LOCATION ZONE: HAND

## 2019-10-14 ASSESSMENT — LOCATION DETAILED DESCRIPTION DERM
LOCATION DETAILED: SUPERIOR THORACIC SPINE
LOCATION DETAILED: RIGHT CENTRAL MALAR CHEEK
LOCATION DETAILED: LEFT INFERIOR ANTERIOR NECK
LOCATION DETAILED: RIGHT RADIAL DORSAL HAND
LOCATION DETAILED: RIGHT HEEL
LOCATION DETAILED: RIGHT DISTAL POSTERIOR UPPER ARM
LOCATION DETAILED: RIGHT SUPERIOR MEDIAL MIDBACK
LOCATION DETAILED: LEFT CENTRAL MALAR CHEEK
LOCATION DETAILED: LEFT SUPERIOR MEDIAL UPPER BACK
LOCATION DETAILED: RIGHT MEDIAL SUPERIOR CHEST
LOCATION DETAILED: LEFT VENTRAL PROXIMAL FOREARM
LOCATION DETAILED: LEFT PROXIMAL POSTERIOR UPPER ARM
LOCATION DETAILED: LEFT RIB CAGE
LOCATION DETAILED: RIGHT ANTERIOR PROXIMAL THIGH
LOCATION DETAILED: LEFT ULNAR DORSAL HAND
LOCATION DETAILED: RIGHT VENTRAL DISTAL FOREARM
LOCATION DETAILED: RIGHT RIB CAGE
LOCATION DETAILED: LEFT PROXIMAL DORSAL FOREARM
LOCATION DETAILED: RIGHT PROXIMAL DORSAL FOREARM
LOCATION DETAILED: LEFT HEEL
LOCATION DETAILED: PERIUMBILICAL SKIN
LOCATION DETAILED: RIGHT SUPERIOR MEDIAL UPPER BACK
LOCATION DETAILED: LEFT ANTERIOR PROXIMAL THIGH

## 2019-10-14 NOTE — HPI: FULL BODY SKIN EXAMINATION
How Severe Are Your Spot(S)?: mild
What Type Of Note Output Would You Prefer (Optional)?: Standard Output
What Is The Reason For Today's Visit?: Full Body Skin Examination
What Is The Reason For Today's Visit? (Being Monitored For X): concerning skin lesions on an annual basis
Additional History: Full body exam. No concerns at this time. Has history of Melanoma.

## 2019-10-22 DIAGNOSIS — E78.5 HYPERLIPIDEMIA LDL GOAL <130: ICD-10-CM

## 2019-10-22 DIAGNOSIS — E53.8 VITAMIN B12 DEFICIENCY: ICD-10-CM

## 2019-10-22 DIAGNOSIS — E55.9 VITAMIN D DEFICIENCY DISEASE: ICD-10-CM

## 2019-11-19 ENCOUNTER — HOSPITAL ENCOUNTER (OUTPATIENT)
Dept: RADIOLOGY | Facility: MEDICAL CENTER | Age: 74
End: 2019-11-19
Attending: NURSE PRACTITIONER
Payer: MEDICARE

## 2019-11-19 DIAGNOSIS — Z12.31 VISIT FOR SCREENING MAMMOGRAM: ICD-10-CM

## 2019-11-19 PROCEDURE — 77063 BREAST TOMOSYNTHESIS BI: CPT

## 2019-11-20 ENCOUNTER — TELEPHONE (OUTPATIENT)
Dept: MEDICAL GROUP | Facility: MEDICAL CENTER | Age: 74
End: 2019-11-20

## 2019-11-26 ENCOUNTER — TELEPHONE (OUTPATIENT)
Dept: MEDICAL GROUP | Facility: MEDICAL CENTER | Age: 74
End: 2019-11-26

## 2019-11-26 NOTE — TELEPHONE ENCOUNTER
Future Appointments       Provider Department Center    11/27/2019 9:00 AM [facility]     12/3/2019 9:00 AM DANIEL Ogden; [facility]          ANNUAL WELLNESS VISIT PRE-VISIT PLANNING WITH OUTREACH    1.  If any orders were placed at last visit, do we have Results/Consult Notes?        •  Labs - Labs ordered, NOT completed. Patient advised to complete prior to next appointment.  Note: If patient appointment is for lab review and patient did not complete labs, check with provider if OK to reschedule patient until labs completed.       •  Imaging - Imaging ordered, completed and results are in chart.       •  Referrals - No referrals were ordered at last office visit.    2.  Immunizations were updated in Epic using WebIZ?:Yes       •  WebIZ Recommendations: SHINGRIX (Shingles)       •  Is patient due for Tdap? NO       •  Is patient due for Shingrx? YES. Patient was not notified of copay/out of pocket cost.    3.  Patient has the following Care Path diagnoses on Problem List:  NONE    4.  Orders for overdue Health Maintenance topics pended in Pre-Charting? NO

## 2019-11-27 ENCOUNTER — HOSPITAL ENCOUNTER (OUTPATIENT)
Dept: LAB | Facility: MEDICAL CENTER | Age: 74
End: 2019-11-27
Attending: NURSE PRACTITIONER
Payer: MEDICARE

## 2019-11-27 DIAGNOSIS — E53.8 VITAMIN B12 DEFICIENCY: ICD-10-CM

## 2019-11-27 DIAGNOSIS — E78.5 HYPERLIPIDEMIA LDL GOAL <130: ICD-10-CM

## 2019-11-27 DIAGNOSIS — E55.9 VITAMIN D DEFICIENCY DISEASE: ICD-10-CM

## 2019-11-27 LAB — 25(OH)D3 SERPL-MCNC: 43 NG/ML (ref 30–100)

## 2019-11-27 PROCEDURE — 82306 VITAMIN D 25 HYDROXY: CPT

## 2019-11-27 PROCEDURE — 80053 COMPREHEN METABOLIC PANEL: CPT

## 2019-11-27 PROCEDURE — 36415 COLL VENOUS BLD VENIPUNCTURE: CPT

## 2019-11-27 PROCEDURE — 82607 VITAMIN B-12: CPT

## 2019-11-27 PROCEDURE — 80061 LIPID PANEL: CPT

## 2019-11-28 LAB
ALBUMIN SERPL BCP-MCNC: 4.1 G/DL (ref 3.2–4.9)
ALBUMIN/GLOB SERPL: 1.4 G/DL
ALP SERPL-CCNC: 56 U/L (ref 30–99)
ALT SERPL-CCNC: 11 U/L (ref 2–50)
ANION GAP SERPL CALC-SCNC: 5 MMOL/L (ref 0–11.9)
AST SERPL-CCNC: 15 U/L (ref 12–45)
BILIRUB SERPL-MCNC: 0.6 MG/DL (ref 0.1–1.5)
BUN SERPL-MCNC: 21 MG/DL (ref 8–22)
CALCIUM SERPL-MCNC: 9.4 MG/DL (ref 8.5–10.5)
CHLORIDE SERPL-SCNC: 105 MMOL/L (ref 96–112)
CHOLEST SERPL-MCNC: 260 MG/DL (ref 100–199)
CO2 SERPL-SCNC: 30 MMOL/L (ref 20–33)
CREAT SERPL-MCNC: 0.9 MG/DL (ref 0.5–1.4)
FASTING STATUS PATIENT QL REPORTED: NORMAL
GLOBULIN SER CALC-MCNC: 2.9 G/DL (ref 1.9–3.5)
GLUCOSE SERPL-MCNC: 87 MG/DL (ref 65–99)
HDLC SERPL-MCNC: 78 MG/DL
LDLC SERPL CALC-MCNC: 168 MG/DL
POTASSIUM SERPL-SCNC: 4.3 MMOL/L (ref 3.6–5.5)
PROT SERPL-MCNC: 7 G/DL (ref 6–8.2)
SODIUM SERPL-SCNC: 140 MMOL/L (ref 135–145)
TRIGL SERPL-MCNC: 68 MG/DL (ref 0–149)
VIT B12 SERPL-MCNC: >1500 PG/ML (ref 211–911)

## 2019-12-03 ENCOUNTER — OFFICE VISIT (OUTPATIENT)
Dept: MEDICAL GROUP | Facility: MEDICAL CENTER | Age: 74
End: 2019-12-03
Payer: MEDICARE

## 2019-12-03 VITALS
SYSTOLIC BLOOD PRESSURE: 114 MMHG | OXYGEN SATURATION: 98 % | BODY MASS INDEX: 24.1 KG/M2 | DIASTOLIC BLOOD PRESSURE: 78 MMHG | WEIGHT: 136 LBS | HEIGHT: 63 IN | TEMPERATURE: 97.6 F | HEART RATE: 58 BPM

## 2019-12-03 DIAGNOSIS — M85.80 OSTEOPENIA, UNSPECIFIED LOCATION: ICD-10-CM

## 2019-12-03 DIAGNOSIS — Z79.890 POSTMENOPAUSAL HRT (HORMONE REPLACEMENT THERAPY): ICD-10-CM

## 2019-12-03 DIAGNOSIS — E55.9 VITAMIN D DEFICIENCY DISEASE: ICD-10-CM

## 2019-12-03 DIAGNOSIS — N95.9 POST MENOPAUSAL PROBLEMS: ICD-10-CM

## 2019-12-03 DIAGNOSIS — E78.5 HYPERLIPIDEMIA LDL GOAL <130: ICD-10-CM

## 2019-12-03 DIAGNOSIS — H40.1230 LOW-TENSION GLAUCOMA OF BOTH EYES, UNSPECIFIED GLAUCOMA STAGE: ICD-10-CM

## 2019-12-03 DIAGNOSIS — Z85.820 HISTORY OF MELANOMA: ICD-10-CM

## 2019-12-03 DIAGNOSIS — E53.8 VITAMIN B12 DEFICIENCY: ICD-10-CM

## 2019-12-03 DIAGNOSIS — Z12.11 SCREENING FOR MALIGNANT NEOPLASM OF COLON: ICD-10-CM

## 2019-12-03 PROCEDURE — G0439 PPPS, SUBSEQ VISIT: HCPCS | Performed by: NURSE PRACTITIONER

## 2019-12-03 RX ORDER — DOXYCYCLINE 40 MG/1
40 CAPSULE ORAL EVERY MORNING
Qty: 30 CAP | Refills: 3 | Status: SHIPPED | OUTPATIENT
Start: 2019-12-03 | End: 2021-09-24

## 2019-12-03 RX ORDER — ESTRADIOL 0.03 MG/D
1 FILM, EXTENDED RELEASE TRANSDERMAL
Qty: 24 PATCH | Refills: 3 | Status: SHIPPED | OUTPATIENT
Start: 2019-12-03 | End: 2020-10-23

## 2019-12-03 RX ORDER — ACETAMINOPHEN 160 MG
TABLET,DISINTEGRATING ORAL
COMMUNITY
End: 2022-06-30

## 2019-12-03 ASSESSMENT — ENCOUNTER SYMPTOMS: GENERAL WELL-BEING: GOOD

## 2019-12-03 ASSESSMENT — ACTIVITIES OF DAILY LIVING (ADL): BATHING_REQUIRES_ASSISTANCE: 0

## 2019-12-03 ASSESSMENT — PATIENT HEALTH QUESTIONNAIRE - PHQ9: CLINICAL INTERPRETATION OF PHQ2 SCORE: 0

## 2019-12-03 NOTE — ASSESSMENT & PLAN NOTE
She is currently due dexa scan.  On d3 otc.  Not on ca++ she doesn't do weight bearing exercises.  No hx of fx.

## 2019-12-03 NOTE — ASSESSMENT & PLAN NOTE
She has failed multiple meds d/t leg pain, weakness and swelling.  Failed zocor, lipitor, crestor and well chol.  She is now going to try repatha if ins will cover.

## 2019-12-03 NOTE — PROGRESS NOTES
Chief Complaint   Patient presents with   • Annual Wellness Visit         HPI:  Stephania is a 74 y.o. here for Medicare Annual Wellness Visit    Seen in f/u for HRA.  Feeling well.  Reviewed lab with pt.  Her CMP, GFR, D, B12 IS WNL.  LP shows good trg and HDL. LDL is up from 112 to 168 off lipitor.  Had to stop it also d/t s/e.  Goal is <130.    She is due for colonoscopy    Vitamin B12 deficiency  Her current b12 is now wnl on oral otc supplement.    Postmenopausal HRT (hormone replacement therapy)  Stable on HRT.  No sx    Osteopenia  She is currently due dexa scan.  On d3 otc.  Not on ca++ she doesn't do weight bearing exercises.  No hx of fx.     History of melanoma  No current issues.  She is followed by derm.  Just saw them and all is ok    Vitamin D deficiency disease  Stable and controlled on otc supplement.     Glaucoma (increased eye pressure)  Followed by ophth.  Vision intact.  Pressures are stable.    Hyperlipidemia LDL goal <130  She has failed multiple meds d/t leg pain, weakness and swelling.  Failed zocor, lipitor, crestor and well chol.  She is now going to try repatha if ins will cover.          Patient Active Problem List    Diagnosis Date Noted   • Glaucoma (increased eye pressure)    • Hyperlipidemia LDL goal <130 11/03/2016   • Vitamin D deficiency disease    • Preventative health care 10/17/2013   • Osteopenia 10/07/2012   • History of melanoma 10/07/2012   • Vitamin B12 deficiency 10/04/2012   • Postmenopausal HRT (hormone replacement therapy) 10/04/2012   • Seasonal allergies 10/04/2012       Current Outpatient Medications   Medication Sig Dispense Refill   • Cholecalciferol (VITAMIN D3) 2000 UNIT Cap Take  by mouth.     • Evolocumab, REPATHA, (REPATHA SURECLICK) 140 MG/ML Solution Auto-injector Inject 1 Each as instructed Once for 1 dose. 1 Each 2   • doxycycline (ORACEA) 40 MG capsule Take 1 Cap by mouth every morning. 30 Cap 3   • Estradiol 0.025 MG/24HR PATCH BIWEEKLY Apply 1 Patch to  skin as directed every 72 hours. 24 Patch 3   • Non Formulary Request Apply 1 Application to affected area(s) every day. DHEA/prog/test 10/50/3mg/gm apply 1 ml topically daily except for sundays. 1 Each 3   • Latanoprost (XALATAN OP) by Ophthalmic route.     • loratadine (CLARITIN) 10 MG TABS Take 10 mg by mouth every day.       No current facility-administered medications for this visit.         Patient is taking medications as noted in medication list.  Current supplements as per medication list.     Allergies: Augmentin and Statins [hmg-coa-r inhibitors]    Current social contact/activities: Patient reports playing golf and going to [employer]     Is patient current with immunizations? No, due for SHINGRIX (Shingles). Patient is interested in receiving SHINGRIX (Shingles) today.    She  reports that she has been smoking. She has never used smokeless tobacco. She reports current alcohol use. She reports that she does not use drugs.  Ready to quit: Not Answered  Counseling given: Not Answered  Comment: only smokes socially        DPA/Advanced directive: Patient has Living Will on file.     ROS:    Gait: Uses no assistive device   Ostomy: No   Other tubes: No   Amputations: No   Chronic oxygen use No   Last eye exam Patient reports yesterday   Wears hearing aids: No   : Denies any urinary leakage during the last 6 months  Review of Systems   Constitutional: Negative.  Negative for fever, chills, weight loss, malaise/fatigue and diaphoresis.   HENT: Negative.  Negative for hearing loss, ear pain, nosebleeds, congestion, sore throat, neck pain, tinnitus and ear discharge.    Eyes: Negative.  Negative for blurred vision, double vision, photophobia, pain, discharge and redness.   Respiratory: Negative.  Negative for cough, hemoptysis, sputum production, shortness of breath, wheezing and stridor.    Cardiovascular: Negative.  Negative for chest pain, palpitations, orthopnea, claudication, leg swelling and PND.    Gastrointestinal: Negative.  Negative for heartburn, nausea, vomiting, abdominal pain, diarrhea, constipation, blood in stool and melena.   Genitourinary: Negative.  Negative for dysuria, urgency, frequency, incontinence, hematuria and flank pain.   Musculoskeletal: Negative.  Negative for myalgias, back pain, joint pain and falls.   Skin: Negative.  Negative for itching and rash.   Neurological: Negative.  Negative for dizziness, tingling, tremors, sensory change, speech change, focal weakness, seizures, loss of consciousness, weakness and headaches.   Endo/Heme/Allergies: Negative.  Negative for environmental allergies and polydipsia. Does not bruise/bleed easily.   Psychiatric/Behavioral: Negative.  Negative for depression, suicidal ideas, hallucinations, memory loss and substance abuse. The patient is not nervous/anxious and does not have insomnia.    All other systems reviewed and are negative.      Depression Screening    Little interest or pleasure in doing things?  0 - not at all  Feeling down, depressed, or hopeless? 0 - not at all  Patient Health Questionnaire Score: 0    If depressive symptoms identified deferred to follow up visit unless specifically addressed in assessment and plan.    Interpretation of PHQ-9 Total Score   Score Severity   1-4 No Depression   5-9 Mild Depression   10-14 Moderate Depression   15-19 Moderately Severe Depression   20-27 Severe Depression    Screening for Cognitive Impairment    Three Minute Recall (village, kitchen, baby)  3/3    Curtis clock face with all 12 numbers and set the hands to show 10 past 10.  Yes 5/5  If cognitive concerns identified, deferred for follow up unless specifically addressed in assessment and plan.    Fall Risk Assessment    Has the patient had two or more falls in the last year or any fall with injury in the last year?  No  If fall risk identified, deferred for follow up unless specifically addressed in assessment and plan.    Safety  Assessment    Throw rugs on floor.  Yes  Handrails on all stairs.  Yes  Good lighting in all hallways.  Yes  Difficulty hearing.  No  Patient counseled about all safety risks that were identified.    Functional Assessment ADLs    Are there any barriers preventing you from cooking for yourself or meeting nutritional needs?  No.    Are there any barriers preventing you from driving safely or obtaining transportation?  No.    Are there any barriers preventing you from using a telephone or calling for help?  No.    Are there any barriers preventing you from shopping?  No.    Are there any barriers preventing you from taking care of your own finances?  No.    Are there any barriers preventing you from managing your medications?  No.    Are there any barriers preventing you from showering, bathing or dressing yourself?  No.    Are you currently engaging in any exercise or physical activity?  Yes.  Patient reports playing golf, walking, cleaning house  What is your perception of your health?  Good.    Health Maintenance Summary                HEPATITIS C SCREENING Overdue 1945     IMM ZOSTER VACCINES Overdue 3/29/2007      Done 2/1/2007 Imm Admin: Zoster Vaccine Live (ZVL) (Zostavax)    BONE DENSITY Overdue 11/4/2014      Done 11/4/2009 L +1.2, H +1.0    Annual Wellness Visit Overdue 11/20/2019      Done 11/19/2018      Patient has more history with this topic...    COLONOSCOPY Next Due 12/19/2019      Done 12/19/2014     MAMMOGRAM Next Due 11/19/2020      Done 11/19/2019 MA-SCREENING MAMMO BILAT W/TOMOSYNTHESIS W/CAD     Patient has more history with this topic...    IMM DTaP/Tdap/Td Vaccine Next Due 11/8/2027      Done 11/8/2017 Imm Admin: Tdap Vaccine          Patient Care Team:  DANIEL Ogden as PCP - General (Family Medicine)  Vitaly Alvarado O.D. as Consulting Physician (Optometry)  DANIEL Ashley (Dermatology)    Social History     Tobacco Use   • Smoking status: Current Some Day Smoker   •  "Smokeless tobacco: Never Used   • Tobacco comment: only smokes socially   Substance Use Topics   • Alcohol use: Yes     Alcohol/week: 0.0 oz   • Drug use: No     Family History   Problem Relation Age of Onset   • Cancer Father         colon cancer dx at age ?   • Cancer Paternal Grandfather 80        colon cancer    • Heart Disease Mother    • Stroke Mother    • Hypertension Mother    • Cancer Maternal Grandmother         breast   • Lupus Sister      She  has a past medical history of Allergic rhinitis due to allergen, B12 deficiency, Glaucoma (increased eye pressure), History of melanoma, Hormone replacement therapy (postmenopausal), Hyperlipemia, Osteopenia, and Vitamin D deficiency disease.   History reviewed. No pertinent surgical history.        Exam:     /78   Pulse (!) 58   Temp 36.4 °C (97.6 °F) (Temporal)   Ht 1.6 m (5' 3\")   Wt 61.7 kg (136 lb)   SpO2 98%  Body mass index is 24.09 kg/m².    Hearing excellent.    Dentition good  Alert, oriented in no acute distress.  Eye contact is good, speech goal directed, affect calm  Physical Exam   Vitals reviewed.  Constitutional: oriented to person, place, and time. appears well-developed and well-nourished. No distress.   HENT: Head: Normocephalic and atraumatic. Bilateral tympanic membranes wnl w/o bulging.  Right Ear: External ear normal. Left Ear: External ear normal. Nose: Nose normal.  Mouth/Throat: Oropharynx is clear and moist. No oropharyngeal exudate. radha tm wnl. Eyes: Conjunctivae and EOM are normal. Pupils are equal, round, and reactive to light. Right eye exhibits no discharge. Left eye exhibits no discharge. No scleral icterus.    Neck: Normal range of motion. Neck supple. No JVD present.   Cardiovascular: Normal rate, regular rhythm, normal heart sounds and intact distal pulses.  Exam reveals no gallop and no friction rub.  No murmur heard.  No carotid bruits   Pulmonary/Chest: Effort normal and breath sounds normal. No stridor. No " respiratory distress. no wheezes or rales. exhibits no tenderness.   Abdominal: Soft. Bowel sounds are normal. exhibits no distension and no mass. No tenderness. no rebound and no guarding.   Musculoskeletal: Normal range of motion. exhibits no edema or tenderness.  radha pedal pulses 2+.  Lymphadenopathy:  no cervical or supraclavicular adenopathy.   Neurological: alert and oriented to person, place, and time. has normal reflexes. displays normal reflexes. No cranial nerve deficit. exhibits normal muscle tone. Coordination normal.   Skin: Skin is warm and dry. No rash noted. no diaphoresis. No erythema. No pallor.   Psychiatric: normal mood and affect. behavior is normal.         Assessment and Plan. The following treatment and monitoring plan is recommended:    1. Hyperlipidemia LDL goal <130  Subsequent Annual Wellness Visit - Includes PPPS ()    Evolocumab, REPATHA, (REPATHA SURECLICK) 140 MG/ML Solution Auto-injector    Comp Metabolic Panel    Lipid Profile    not controlled but intolerant of multiple statins.  try repatha.  do lab 6 wks after starting med.  f/u for review   2. Vitamin B12 deficiency  Subsequent Annual Wellness Visit - Includes PPPS ()    doxycycline (ORACEA) 40 MG capsule    stable on otc supplement. plan recheck lab 1 yr.  call for lab slip.  f/u for review   3. Postmenopausal HRT (hormone replacement therapy)  Subsequent Annual Wellness Visit - Includes PPPS ()    doxycycline (ORACEA) 40 MG capsule    Estradiol 0.025 MG/24HR PATCH BIWEEKLY    Non Formulary Request    stable on meds.  refilled meds.   4. Osteopenia, unspecified location  Subsequent Annual Wellness Visit - Includes PPPS ()    DS-BONE DENSITY STUDY (DEXA)    due for dexa scan. on d3 but not ca++.  not doing wt bearing exercises.  f/u with pt with results.    5. History of melanoma  Subsequent Annual Wellness Visit - Includes PPPS ()    doxycycline (ORACEA) 40 MG capsule    no current concerns.  followed  by derm   6. Vitamin D deficiency disease  Subsequent Annual Wellness Visit - Includes PPPS ()    doxycycline (ORACEA) 40 MG capsule    stable on otc meds.     7. Low-tension glaucoma of both eyes, unspecified glaucoma stage  Subsequent Annual Wellness Visit - Includes PPPS ()    stable.  followed by ophth   8. Post menopausal problems  DS-BONE DENSITY STUDY (DEXA)   9. Screening for malignant neoplasm of colon  REFERRAL TO GASTROENTEROLOGY         Services suggested: No services needed at this time  Health Care Screening recommendations as per orders if indicated.  Referrals offered: PT/OT/Nutrition counseling/Behavioral Health/Smoking cessation as per orders if indicated.    Discussion today about general wellness and lifestyle habits:    · Prevent falls and reduce trip hazards; Cautioned about securing or removing rugs.  · Have a working fire alarm and carbon monoxide detector;   · Engage in regular physical activity and social activities       Follow-up: 6 weeks after starting repatha for lab review

## 2020-01-06 DIAGNOSIS — E78.5 HYPERLIPIDEMIA LDL GOAL <130: ICD-10-CM

## 2020-01-14 DIAGNOSIS — E78.5 HYPERLIPIDEMIA LDL GOAL <100: ICD-10-CM

## 2020-02-03 DIAGNOSIS — E78.5 HYPERLIPIDEMIA LDL GOAL <100: ICD-10-CM

## 2020-05-13 ENCOUNTER — OFFICE VISIT (OUTPATIENT)
Dept: MEDICAL GROUP | Facility: MEDICAL CENTER | Age: 75
End: 2020-05-13
Payer: MEDICARE

## 2020-05-13 VITALS
TEMPERATURE: 98.5 F | BODY MASS INDEX: 24.13 KG/M2 | OXYGEN SATURATION: 96 % | HEIGHT: 63 IN | DIASTOLIC BLOOD PRESSURE: 76 MMHG | WEIGHT: 136.2 LBS | HEART RATE: 61 BPM | SYSTOLIC BLOOD PRESSURE: 116 MMHG

## 2020-05-13 DIAGNOSIS — T78.40XA RASH DUE TO ALLERGY: ICD-10-CM

## 2020-05-13 DIAGNOSIS — R21 RASH DUE TO ALLERGY: ICD-10-CM

## 2020-05-13 DIAGNOSIS — B02.29 POST HERPETIC NEURALGIA: ICD-10-CM

## 2020-05-13 PROCEDURE — 99214 OFFICE O/P EST MOD 30 MIN: CPT | Performed by: NURSE PRACTITIONER

## 2020-05-13 RX ORDER — CLOBETASOL PROPIONATE 0.5 MG/G
1 CREAM TOPICAL 2 TIMES DAILY
Qty: 1 TUBE | Refills: 0 | Status: SHIPPED | OUTPATIENT
Start: 2020-05-13 | End: 2020-12-07

## 2020-05-13 RX ORDER — GABAPENTIN 100 MG/1
100 CAPSULE ORAL
Qty: 30 CAP | Refills: 0 | Status: SHIPPED | OUTPATIENT
Start: 2020-05-13 | End: 2020-06-04

## 2020-05-13 NOTE — PROGRESS NOTES
"  Subjective:     Stephania Galloway is a 74 y.o. female who presents with rash.    HPI:   Seen in f/u for rash.  Last fall She started having stinging and itching on rt scalp.  She thought it was psoriasis.  She also had a red pustule rash.  It started on her neck and radiated down to her arm.  The rash resolved but she still will wake up at nite with \"ITCHING\" in her scalp.    Then last week she noted a rash on her chest.  Nonraised.    She started repatha several months ago.  She was doing well until her last shot last week.  She has now developed a red rash across chest and upper left arm in patches.  + itching.  No wheezing or SOB.  It started after her last repatha shot.      Patient Active Problem List    Diagnosis Date Noted   • Glaucoma (increased eye pressure)    • Hyperlipidemia LDL goal <130 11/03/2016   • Vitamin D deficiency disease    • Preventative health care 10/17/2013   • Osteopenia 10/07/2012   • History of melanoma 10/07/2012   • Vitamin B12 deficiency 10/04/2012   • Postmenopausal HRT (hormone replacement therapy) 10/04/2012   • Seasonal allergies 10/04/2012       Current medicines (including changes today)  Current Outpatient Medications   Medication Sig Dispense Refill   • gabapentin (NEURONTIN) 100 MG Cap Take 1 Cap by mouth every bedtime. 30 Cap 0   • clobetasol (TEMOVATE) 0.05 % Cream Apply 1 Application to affected area(s) 2 times a day. 1 Tube 0   • latanoprost (XALATAN) 0.005 % Solution      • Cholecalciferol (VITAMIN D3) 2000 UNIT Cap Take  by mouth.     • doxycycline (ORACEA) 40 MG capsule Take 1 Cap by mouth every morning. 30 Cap 3   • Estradiol 0.025 MG/24HR PATCH BIWEEKLY Apply 1 Patch to skin as directed every 72 hours. 24 Patch 3   • Non Formulary Request Apply 1 Application to affected area(s) every day. DHEA/prog/test 10/50/3mg/gm apply 1 ml topically daily except for sundays. 1 Each 3   • loratadine (CLARITIN) 10 MG TABS Take 10 mg by mouth every day.       No current " "facility-administered medications for this visit.        Allergies   Allergen Reactions   • Amoxicillin-Pot Clavulanate Hives   • Augmentin    • Statins [Hmg-Coa-R Inhibitors]      myalgias       ROS  Constitutional: Negative. Negative for fever, chills, weight loss, malaise/fatigue and diaphoresis.   HENT: Negative. Negative for hearing loss, ear pain, nosebleeds, congestion, sore throat, neck pain, tinnitus and ear discharge.   Respiratory: Negative. Negative for cough, hemoptysis, sputum production, shortness of breath, wheezing and stridor.   Cardiovascular: Negative. Negative for chest pain, palpitations, orthopnea, claudication, leg swelling and PND.   Gastrointestinal: Denies nausea, vomiting, diarrhea, constipation, heartburn, melena or hematochezia.  Genitourinary: Denies dysuria, hematuria, urinary incontinence, frequency or urgency.        Objective:     /76 (BP Location: Right arm, Patient Position: Sitting, BP Cuff Size: Adult)   Pulse 61   Temp 36.9 °C (98.5 °F) (Temporal)   Ht 1.6 m (5' 3\")   Wt 61.8 kg (136 lb 3.2 oz)   SpO2 96%  Body mass index is 24.13 kg/m².    Physical Exam:  Physical Exam   Vitals reviewed.  Constitutional: oriented to person, place, and time. appears well-developed and well-nourished. No distress.   HENT:  Head: Normocephalic and atraumatic. Right Ear: External ear normal. Left Ear: External ear normal. Nose: Nose normal. Mouth/Throat: Oropharynx is clear and moist. No oropharyngeal exudate.  radha tm wnl.  Eyes: Right eye exhibits no discharge. Left eye exhibits no discharge. No scleral icterus.  Neck: No JVD present.  Cardiovascular: Normal rate, regular rhythm, normal heart sounds and intact distal pulses.  Exam reveals no gallop and no friction rub.  No murmur heard.  No carotid bruits.   Pulmonary/Chest: Effort normal and breath sounds normal. No stridor. No respiratory distress. no wheezes or rales. exhibits no tenderness.   Musculoskeletal: Normal range of " motion. exhibits no edema. radha pedal pulses 2+.  Lymphadenopathy: no cervical or supraclavicular adenopathy.   Neurological: alert and oriented to person, place, and time. exhibits normal muscle tone. Coordination normal.   Skin: Skin is warm and dry. no diaphoresis.  erythematous macular rash noted in large patches over radha upper anterior chest and left arm.  No dg.  Nontender.   Psychiatric: normal mood and affect. behavior is normal.        Assessment and Plan:     The following treatment plan was discussed:    1. Post herpetic neuralgia  gabapentin (NEURONTIN) 100 MG Cap    rt scalp and neck still itching. try neurontin 100 mg hs.  will continue if helps her sx.     2. Rash due to allergy  clobetasol (TEMOVATE) 0.05 % Cream    dc repatha.  try clobetasol.  if worsen sx or not improved, email for medrol or f/u         Followup: Return in about 6 months (around 11/13/2020), or if symptoms worsen or fail to improve.  Call for lab slip

## 2020-06-04 DIAGNOSIS — B02.29 POST HERPETIC NEURALGIA: ICD-10-CM

## 2020-06-04 DIAGNOSIS — E78.5 HYPERLIPIDEMIA LDL GOAL <100: ICD-10-CM

## 2020-06-04 DIAGNOSIS — E78.5 HYPERLIPIDEMIA LDL GOAL <130: ICD-10-CM

## 2020-06-04 RX ORDER — ALIROCUMAB 150 MG/ML
75 INJECTION, SOLUTION SUBCUTANEOUS
Qty: 1 PEN | Refills: 3 | Status: SHIPPED | OUTPATIENT
Start: 2020-06-04 | End: 2020-12-07

## 2020-06-04 RX ORDER — GABAPENTIN 100 MG/1
CAPSULE ORAL
Qty: 90 CAP | Refills: 3 | Status: SHIPPED | OUTPATIENT
Start: 2020-06-04 | End: 2021-09-24

## 2020-07-25 ENCOUNTER — TELEPHONE ENCOUNTER (OUTPATIENT)
Dept: URBAN - METROPOLITAN AREA CLINIC 13 | Facility: CLINIC | Age: 75
End: 2020-07-25

## 2020-07-26 ENCOUNTER — TELEPHONE ENCOUNTER (OUTPATIENT)
Dept: URBAN - METROPOLITAN AREA CLINIC 13 | Facility: CLINIC | Age: 75
End: 2020-07-26

## 2020-07-26 RX ORDER — CETIRIZINE HYDROCHLORIDE 10 MG/1
TABLET, FILM COATED ORAL
Refills: 0 | Status: ACTIVE | COMMUNITY
Start: 2009-06-05

## 2020-10-19 ENCOUNTER — APPOINTMENT (RX ONLY)
Dept: URBAN - METROPOLITAN AREA CLINIC 4 | Facility: CLINIC | Age: 75
Setting detail: DERMATOLOGY
End: 2020-10-19

## 2020-10-19 DIAGNOSIS — L81.4 OTHER MELANIN HYPERPIGMENTATION: ICD-10-CM

## 2020-10-19 DIAGNOSIS — Z71.89 OTHER SPECIFIED COUNSELING: ICD-10-CM

## 2020-10-19 DIAGNOSIS — D22 MELANOCYTIC NEVI: ICD-10-CM

## 2020-10-19 DIAGNOSIS — L82.1 OTHER SEBORRHEIC KERATOSIS: ICD-10-CM

## 2020-10-19 DIAGNOSIS — Z85.820 PERSONAL HISTORY OF MALIGNANT MELANOMA OF SKIN: ICD-10-CM

## 2020-10-19 DIAGNOSIS — L72.0 EPIDERMAL CYST: ICD-10-CM

## 2020-10-19 DIAGNOSIS — D18.0 HEMANGIOMA: ICD-10-CM

## 2020-10-19 PROBLEM — D18.01 HEMANGIOMA OF SKIN AND SUBCUTANEOUS TISSUE: Status: ACTIVE | Noted: 2020-10-19

## 2020-10-19 PROBLEM — D22.9 MELANOCYTIC NEVI, UNSPECIFIED: Status: ACTIVE | Noted: 2020-10-19

## 2020-10-19 PROCEDURE — ? COUNSELING

## 2020-10-19 PROCEDURE — 99213 OFFICE O/P EST LOW 20 MIN: CPT

## 2020-10-19 ASSESSMENT — LOCATION ZONE DERM: LOCATION ZONE: FACE

## 2020-10-19 ASSESSMENT — LOCATION DETAILED DESCRIPTION DERM: LOCATION DETAILED: RIGHT MEDIAL MALAR CHEEK

## 2020-10-19 ASSESSMENT — LOCATION SIMPLE DESCRIPTION DERM: LOCATION SIMPLE: RIGHT CHEEK

## 2020-10-19 NOTE — HPI: FULL BODY SKIN EXAMINATION
How Severe Are Your Spot(S)?: mild
What Is The Reason For Today's Visit?: Full Body Skin Examination
What Is The Reason For Today's Visit? (Being Monitored For X): concerning skin lesions on an annual basis
Additional History: FBE. MM: Back 1966, 1979

## 2020-10-19 NOTE — PROCEDURE: MIPS QUALITY
Quality 130: Documentation Of Current Medications In The Medical Record: Current Medications Documented
Quality 431: Preventive Care And Screening: Unhealthy Alcohol Use - Screening: Patient screened for unhealthy alcohol use using a single question and scores less than 2 times per year
Quality 138: Melanoma: Coordination Of Care: A treatment plan was communicated to the physicians providing continuing care within one month of diagnosis outlining: diagnosis, tumor thickness and a plan for surgery or alternate care.
Quality 137: Melanoma: Continuity Of Care - Recall System: Patient information entered into a recall system that includes: target date for the next exam specified AND a process to follow up with patients regarding missed or unscheduled appointments
Quality 111:Pneumonia Vaccination Status For Older Adults: Pneumococcal Vaccination Previously Received
Quality 397: Melanoma: Reporting: The pathology report includes a pT Category and statement on thickness and ulceration for pT1, mitotic rate.
Detail Level: Detailed
Quality 226: Preventive Care And Screening: Tobacco Use: Screening And Cessation Intervention: Patient screened for tobacco use and is an ex/non-smoker

## 2020-11-10 DIAGNOSIS — Z79.890 POSTMENOPAUSAL HRT (HORMONE REPLACEMENT THERAPY): ICD-10-CM

## 2020-11-10 RX ORDER — ESTRADIOL 0.03 MG/D
FILM, EXTENDED RELEASE TRANSDERMAL
Qty: 8 PATCH | Refills: 1 | Status: SHIPPED | OUTPATIENT
Start: 2020-11-10 | End: 2020-12-07 | Stop reason: SDUPTHER

## 2020-12-01 ENCOUNTER — HOSPITAL ENCOUNTER (OUTPATIENT)
Dept: RADIOLOGY | Facility: MEDICAL CENTER | Age: 75
End: 2020-12-01
Attending: NURSE PRACTITIONER
Payer: MEDICARE

## 2020-12-01 ENCOUNTER — TELEPHONE (OUTPATIENT)
Dept: MEDICAL GROUP | Facility: MEDICAL CENTER | Age: 75
End: 2020-12-01

## 2020-12-01 DIAGNOSIS — Z12.31 ENCOUNTER FOR MAMMOGRAM TO ESTABLISH BASELINE MAMMOGRAM: ICD-10-CM

## 2020-12-01 PROCEDURE — 77067 SCR MAMMO BI INCL CAD: CPT

## 2020-12-02 ENCOUNTER — HOSPITAL ENCOUNTER (OUTPATIENT)
Dept: LAB | Facility: MEDICAL CENTER | Age: 75
End: 2020-12-02
Attending: NURSE PRACTITIONER
Payer: MEDICARE

## 2020-12-02 DIAGNOSIS — E78.5 HYPERLIPIDEMIA LDL GOAL <130: ICD-10-CM

## 2020-12-02 LAB
ALBUMIN SERPL BCP-MCNC: 4.2 G/DL (ref 3.2–4.9)
ALBUMIN/GLOB SERPL: 1.6 G/DL
ALP SERPL-CCNC: 61 U/L (ref 30–99)
ALT SERPL-CCNC: 13 U/L (ref 2–50)
ANION GAP SERPL CALC-SCNC: 8 MMOL/L (ref 7–16)
AST SERPL-CCNC: 15 U/L (ref 12–45)
BILIRUB SERPL-MCNC: 0.5 MG/DL (ref 0.1–1.5)
BUN SERPL-MCNC: 18 MG/DL (ref 8–22)
CALCIUM SERPL-MCNC: 9.7 MG/DL (ref 8.5–10.5)
CHLORIDE SERPL-SCNC: 104 MMOL/L (ref 96–112)
CHOLEST SERPL-MCNC: 276 MG/DL (ref 100–199)
CO2 SERPL-SCNC: 26 MMOL/L (ref 20–33)
CREAT SERPL-MCNC: 0.8 MG/DL (ref 0.5–1.4)
FASTING STATUS PATIENT QL REPORTED: NORMAL
GLOBULIN SER CALC-MCNC: 2.6 G/DL (ref 1.9–3.5)
GLUCOSE SERPL-MCNC: 81 MG/DL (ref 65–99)
HDLC SERPL-MCNC: 97 MG/DL
LDLC SERPL CALC-MCNC: 167 MG/DL
POTASSIUM SERPL-SCNC: 4.3 MMOL/L (ref 3.6–5.5)
PROT SERPL-MCNC: 6.8 G/DL (ref 6–8.2)
SODIUM SERPL-SCNC: 138 MMOL/L (ref 135–145)
TRIGL SERPL-MCNC: 59 MG/DL (ref 0–149)

## 2020-12-02 PROCEDURE — 80061 LIPID PANEL: CPT

## 2020-12-02 PROCEDURE — 80053 COMPREHEN METABOLIC PANEL: CPT

## 2020-12-02 PROCEDURE — 36415 COLL VENOUS BLD VENIPUNCTURE: CPT

## 2020-12-07 ENCOUNTER — TELEPHONE (OUTPATIENT)
Dept: MEDICAL GROUP | Facility: MEDICAL CENTER | Age: 75
End: 2020-12-07

## 2020-12-07 ENCOUNTER — HOSPITAL ENCOUNTER (OUTPATIENT)
Dept: RADIOLOGY | Facility: MEDICAL CENTER | Age: 75
End: 2020-12-07
Attending: NURSE PRACTITIONER
Payer: MEDICARE

## 2020-12-07 ENCOUNTER — OFFICE VISIT (OUTPATIENT)
Dept: MEDICAL GROUP | Facility: MEDICAL CENTER | Age: 75
End: 2020-12-07
Payer: MEDICARE

## 2020-12-07 VITALS
WEIGHT: 137 LBS | OXYGEN SATURATION: 96 % | TEMPERATURE: 98.9 F | HEART RATE: 56 BPM | SYSTOLIC BLOOD PRESSURE: 124 MMHG | BODY MASS INDEX: 24.27 KG/M2 | DIASTOLIC BLOOD PRESSURE: 80 MMHG

## 2020-12-07 DIAGNOSIS — E55.9 VITAMIN D DEFICIENCY: ICD-10-CM

## 2020-12-07 DIAGNOSIS — E78.5 HYPERLIPIDEMIA LDL GOAL <130: ICD-10-CM

## 2020-12-07 DIAGNOSIS — J30.2 SEASONAL ALLERGIES: ICD-10-CM

## 2020-12-07 DIAGNOSIS — R21 RASH: ICD-10-CM

## 2020-12-07 DIAGNOSIS — R22.31 NODULE OF FINGER OF RIGHT HAND: ICD-10-CM

## 2020-12-07 DIAGNOSIS — Z79.890 POSTMENOPAUSAL HRT (HORMONE REPLACEMENT THERAPY): ICD-10-CM

## 2020-12-07 DIAGNOSIS — N95.9 POST MENOPAUSAL PROBLEMS: ICD-10-CM

## 2020-12-07 DIAGNOSIS — H40.1230 LOW-TENSION GLAUCOMA OF BOTH EYES, UNSPECIFIED GLAUCOMA STAGE: ICD-10-CM

## 2020-12-07 DIAGNOSIS — Z85.820 HISTORY OF MELANOMA: ICD-10-CM

## 2020-12-07 DIAGNOSIS — E53.8 VITAMIN B12 DEFICIENCY: ICD-10-CM

## 2020-12-07 DIAGNOSIS — M85.80 OSTEOPENIA, UNSPECIFIED LOCATION: ICD-10-CM

## 2020-12-07 PROCEDURE — G0439 PPPS, SUBSEQ VISIT: HCPCS | Performed by: NURSE PRACTITIONER

## 2020-12-07 PROCEDURE — 73140 X-RAY EXAM OF FINGER(S): CPT | Mod: RT

## 2020-12-07 RX ORDER — BIOTIN 1000 MCG
TABLET,CHEWABLE ORAL
COMMUNITY
End: 2022-06-30

## 2020-12-07 RX ORDER — TRETINOIN 0.5 MG/G
1 LOTION TOPICAL
Qty: 20 G | Refills: 1 | Status: SHIPPED | OUTPATIENT
Start: 2020-12-07 | End: 2020-12-08 | Stop reason: SDUPTHER

## 2020-12-07 RX ORDER — ESTRADIOL 0.03 MG/D
FILM, EXTENDED RELEASE TRANSDERMAL
Qty: 24 PATCH | Refills: 3 | Status: SHIPPED | OUTPATIENT
Start: 2020-12-07 | End: 2021-11-09

## 2020-12-07 RX ORDER — EZETIMIBE 10 MG/1
10 TABLET ORAL DAILY
Qty: 8 TAB | Refills: 3 | Status: SHIPPED | OUTPATIENT
Start: 2020-12-07 | End: 2021-01-04 | Stop reason: SDUPTHER

## 2020-12-07 ASSESSMENT — ACTIVITIES OF DAILY LIVING (ADL): BATHING_REQUIRES_ASSISTANCE: 0

## 2020-12-07 ASSESSMENT — ENCOUNTER SYMPTOMS: GENERAL WELL-BEING: EXCELLENT

## 2020-12-07 ASSESSMENT — PATIENT HEALTH QUESTIONNAIRE - PHQ9: CLINICAL INTERPRETATION OF PHQ2 SCORE: 0

## 2020-12-07 NOTE — ASSESSMENT & PLAN NOTE
Reviewed lab with pt.  Her CMP, GFR is wnl.  LP shows good trg and HDL.  LDL is high at 160.  Goal is <130.  Intolerant of statins.  Will try zetia.

## 2020-12-07 NOTE — PROGRESS NOTES
Chief Complaint   Patient presents with   • Annual Wellness Visit         HPI:  Stephania Galloway is a 75 y.o. here for Medicare Annual Wellness Visit.  She is feeling well.  No sx of COVID.  She needs refills on her meds.   She saw derm for hx of melanoma.  She was given altreno.  Needs refill.  She can't ahold of them.  She has noted a nodule on distal joint rt ring finger.  No reddness.  No pain.      Vitamin B12 deficiency  She stopped her otc supplement.  Will restart    Postmenopausal HRT (hormone replacement therapy)  She is stable on meds.  Needs refill.     Seasonal allergies  Stable on otc meds.  No sx of infection    Osteopenia  She has not had a dexa recently.  She is on ca++ and d otc.  No hx of fx.  She does not do weight bearing exercises but remains very active.  Will order dexa.    History of melanoma  No current concerns.  She is followed closely by Carrie Blackwood.      Vitamin D deficiency disease  Stable on otc supplement.     Hyperlipidemia LDL goal <130  Reviewed lab with pt.  Her CMP, GFR is wnl.  LP shows good trg and HDL.  LDL is high at 160.  Goal is <130.  Intolerant of statins.  Will try zetia.    Glaucoma (increased eye pressure)  Vision intact.  Followed by ophth      Patient Active Problem List    Diagnosis Date Noted   • Glaucoma (increased eye pressure)    • Hyperlipidemia LDL goal <130 11/03/2016   • Vitamin D deficiency disease    • Osteopenia 10/07/2012   • History of melanoma 10/07/2012   • Vitamin B12 deficiency 10/04/2012   • Postmenopausal HRT (hormone replacement therapy) 10/04/2012   • Seasonal allergies 10/04/2012       Current Outpatient Medications   Medication Sig Dispense Refill   • Calcium Carb-Cholecalciferol (CALCIUM 1000 + D PO) Take  by mouth every day.     • Biotin 1000 MCG Chew Tab Chew.     • Estradiol 0.025 MG/24HR PATCH BIWEEKLY APPLY TO AFFETCED AREA 1 PATCH TO SKIN AS DIRECTED EVERY 72 HOURS. 8 Patch 1   • gabapentin (NEURONTIN) 100 MG Cap TAKE 1 CAPSULE BY  MOUTH AT BEDTIME 90 Cap 3   • latanoprost (XALATAN) 0.005 % Solution      • Cholecalciferol (VITAMIN D3) 2000 UNIT Cap Take  by mouth.     • doxycycline (ORACEA) 40 MG capsule Take 1 Cap by mouth every morning. 30 Cap 3   • loratadine (CLARITIN) 10 MG TABS Take 10 mg by mouth every day.     • Non Formulary Request Apply 1 Application to affected area(s) every day. DHEA/prog/test 10/50/3mg/gm apply 1 ml topically daily except for sundays. (Patient taking differently: Apply 1 Application topically every day. DHEA/prog/test 10/50/3mg/gm apply 1 ml topically daily except for sundays.  Neha Compounding) 1 Each 3     No current facility-administered medications for this visit.             Current supplements as per medication list.       Allergies: Amoxicillin-pot clavulanate, Augmentin, and Statins [hmg-coa-r inhibitors]    Current social contact/activities: patient reports emailing and texting friends and family occasionally, sees her son often, sees her daughter frequently, lives with her      She  reports that she has been smoking. She has never used smokeless tobacco. She reports current alcohol use. She reports that she does not use drugs.  Ready to quit: Not Answered  Counseling given: Not Answered  Comment: only smokes socially      DPA/Advanced Directive:  Patient has Living Will on file.     ROS:    Gait: Uses no assistive device  Ostomy: No  Other tubes: No  Amputations: No  Chronic oxygen use: No  Last eye exam: patient reports October 21, 2020  Wears hearing aids: No   : Denies any urinary leakage during the last 6 months   Review of Systems   Constitutional: Negative.  Negative for fever, chills, weight loss, malaise/fatigue and diaphoresis.   HENT: Negative.  Negative for hearing loss, ear pain, nosebleeds, congestion, sore throat, neck pain, tinnitus and ear discharge.    Eyes: Negative.  Negative for blurred vision, double vision, photophobia, pain, discharge and redness.   Respiratory:  Negative.  Negative for cough, hemoptysis, sputum production, shortness of breath, wheezing and stridor.    Cardiovascular: Negative.  Negative for chest pain, palpitations, orthopnea, claudication, leg swelling and PND.   Gastrointestinal: Negative.  Negative for heartburn, nausea, vomiting, abdominal pain, diarrhea, constipation, blood in stool and melena.   Genitourinary: Negative.  Negative for dysuria, urgency, frequency, incontinence, hematuria and flank pain.   Musculoskeletal: Negative.  Negative for myalgias, back pain, joint pain and falls.   Skin: Negative.  Negative for itching and rash.   Neurological: Negative.  Negative for dizziness, tingling, tremors, sensory change, speech change, focal weakness, seizures, loss of consciousness, weakness and headaches.   Endo/Heme/Allergies: Negative.  Negative for environmental allergies and polydipsia. Does not bruise/bleed easily.   Psychiatric/Behavioral: Negative.  Negative for depression, suicidal ideas, hallucinations, memory loss and substance abuse. The patient is not nervous/anxious and does not have insomnia.    All other systems reviewed and are negative.      Depression Screening    Little interest or pleasure in doing things?  0 - not at all  Feeling down, depressed , or hopeless? 0 - not at all  Patient Health Questionnaire Score: 0     If depressive symptoms identified deferred to follow up visit unless specifically addressed in assessment and plan.    Interpretation of PHQ-9 Total Score   Score Severity   1-4 No Depression   5-9 Mild Depression   10-14 Moderate Depression   15-19 Moderately Severe Depression   20-27 Severe Depression    Screening for Cognitive Impairment    Three Minute Recall (river, nation, finger) 3/3    Curtis clock face with all 12 numbers and set the hands to show 10 past 11.  Yes 5/5  Cognitive concerns identified deferred for follow up unless specifically addressed in assessment and plan.    Fall Risk Assessment    Has the  patient had two or more falls in the last year or any fall with injury in the last year?  No    Safety Assessment    Throw rugs on floor.  No  Handrails on all stairs.  Yes  Good lighting in all hallways.  Yes  Difficulty hearing.  No  Patient counseled about all safety risks that were identified.    Functional Assessment ADLs    Are there any barriers preventing you from cooking for yourself or meeting nutritional needs?  No.    Are there any barriers preventing you from driving safely or obtaining transportation?  No.    Are there any barriers preventing you from using a telephone or calling for help?  No.    Are there any barriers preventing you from shopping?  No.    Are there any barriers preventing you from taking care of your own finances?  No.    Are there any barriers preventing you from managing your medications?  No.    Are there any barriers preventing you from showering, bathing or dressing yourself?  No.    Are you currently engaging in any exercise or physical activity?  Yes.  Patient reports golfs occasionally, regular housework/yarkwork  What is your perception of your health?  Excellent.      Health Maintenance Summary                HEPATITIS C SCREENING Overdue 1945     BONE DENSITY Overdue 11/4/2014      Done 11/4/2009 L +1.2, H +1.0    IMM ZOSTER VACCINES Overdue 9/29/2020      Done 8/4/2020 Imm Admin: Zoster Vaccine Recombinant (RZV) (SHINGRIX)     Patient has more history with this topic...    Annual Wellness Visit Overdue 12/3/2020      Done 12/3/2019 SUBSEQUENT ANNUAL WELLNESS VISIT-INCLUDES PPPS ()     Patient has more history with this topic...    MAMMOGRAM Next Due 12/1/2021      Done 12/1/2020 MA-SCREENING MAMMO BILAT W/TOMOSYNTHESIS W/CAD     Patient has more history with this topic...    COLONOSCOPY Next Due 2/6/2025      Done 2/6/2020 REFERRAL TO GI FOR COLONOSCOPY    IMM DTaP/Tdap/Td Vaccine Next Due 11/8/2027      Done 11/8/2017 Imm Admin: Tdap Vaccine          Patient  Care Team:  DANIEL Ogden as PCP - General (Family Medicine)  Vitaly Alvarado O.D. as Consulting Physician (Optometry)  DANIEL Ashley (Dermatology)        Social History     Tobacco Use   • Smoking status: Current Some Day Smoker   • Smokeless tobacco: Never Used   • Tobacco comment: only smokes socially   Substance Use Topics   • Alcohol use: Yes     Alcohol/week: 0.0 oz     Comment: very rarely, social drinking   • Drug use: No     Family History   Problem Relation Age of Onset   • Cancer Father         colon cancer dx at age ?   • Cancer Paternal Grandfather 80        colon cancer    • Heart Disease Mother    • Stroke Mother    • Hypertension Mother    • Cancer Maternal Grandmother         breast   • Lupus Sister      She  has a past medical history of Allergic rhinitis due to allergen, B12 deficiency, Glaucoma (increased eye pressure), History of melanoma, Hormone replacement therapy (postmenopausal), Hyperlipemia, Osteopenia, and Vitamin D deficiency disease.   History reviewed. No pertinent surgical history.    Exam:   /80 (BP Location: Left arm, Patient Position: Sitting, BP Cuff Size: Adult)   Pulse (!) 56   Temp 37.2 °C (98.9 °F) (Temporal)   Wt 62.1 kg (137 lb)   SpO2 96%  Body mass index is 24.27 kg/m².    Hearing excellent.    Dentition good  Alert, oriented in no acute distress.  Eye contact is good, speech goal directed, affect calm  Physical Exam   Vitals reviewed.  Constitutional: oriented to person, place, and time. appears well-developed and well-nourished. No distress.   HENT: Head: Normocephalic and atraumatic. Bilateral tympanic membranes wnl w/o bulging.  Right Ear: External ear normal. Left Ear: External ear normal. Nose: Nose normal.  Mouth/Throat: Oropharynx is clear and moist. No oropharyngeal exudate. radha tm wnl. Eyes: Conjunctivae and EOM are normal. Pupils are equal, round, and reactive to light. Right eye exhibits no discharge. Left eye exhibits no discharge.  No scleral icterus.    Neck: Normal range of motion. Neck supple. No JVD present.   Cardiovascular: Normal rate, regular rhythm, normal heart sounds and intact distal pulses.  Exam reveals no gallop and no friction rub.  No murmur heard.  No carotid bruits   Pulmonary/Chest: Effort normal and breath sounds normal. No stridor. No respiratory distress. no wheezes or rales. exhibits no tenderness.   Abdominal: Soft. Bowel sounds are normal. exhibits no distension and no mass. No tenderness. no rebound and no guarding.   Musculoskeletal: Normal range of motion. exhibits no edema or tenderness.  radha pedal pulses 2+.  Lymphadenopathy:  no cervical or supraclavicular adenopathy.   Neurological: alert and oriented to person, place, and time. has normal reflexes. displays normal reflexes. No cranial nerve deficit. exhibits normal muscle tone. Coordination normal.   Skin: Skin is warm and dry. No rash noted. no diaphoresis. No erythema. No pallor.   Psychiatric: normal mood and affect. behavior is normal.       Assessment and Plan. The following treatment and monitoring plan is recommended:   1. Hyperlipidemia LDL goal <130  ezetimibe (ZETIA) 10 MG Tab    Comp Metabolic Panel    Lipid Profile    Subsequent Annual Wellness Visit - Includes PPPS ()    LDL not controlled with diet and exercise.  intolerant of statins.  try zetia.  do lab late jan before leaving for 3 mo.  f/u w/pt w/results. plan: f/u 6 mo.    2. History of melanoma  Subsequent Annual Wellness Visit - Includes PPPS ()    no current sx.  followed by derm   3. Nodule of finger of right hand  DX-FINGER(S) 2+ RIGHT    Subsequent Annual Wellness Visit - Includes PPPS ()    xray finger.  f/u with pt with result   4. Osteopenia, unspecified location  DS-BONE DENSITY STUDY (DEXA)    Subsequent Annual Wellness Visit - Includes PPPS ()    she is on ca++ and d.  no weight bearing exercises.  due for updated dexa scan   5. Postmenopausal HRT (hormone  replacement therapy)  Estradiol 0.025 MG/24HR PATCH BIWEEKLY    Non Formulary Request    Subsequent Annual Wellness Visit - Includes PPPS ()    stable on meds.  refilled meds.   6. Rash  Tretinoin (ALTRENO) 0.05 % Lotion    Subsequent Annual Wellness Visit - Includes PPPS ()    refilled steroid cream.  she uses prn   7. Vitamin B12 deficiency  Subsequent Annual Wellness Visit - Includes PPPS ()    stable on otc meds   8. Seasonal allergies  Subsequent Annual Wellness Visit - Includes PPPS ()    stable on otc med   9. Vitamin D deficiency disease  Subsequent Annual Wellness Visit - Includes PPPS ()    stble on otc med.    10. Low-tension glaucoma of both eyes, unspecified glaucoma stage  Subsequent Annual Wellness Visit - Includes PPPS ()    stable and followed by ophth. vision intact   11. Post menopausal problems  DS-BONE DENSITY STUDY (DEXA)    Subsequent Annual Wellness Visit - Includes PPPS ()    dexa scan           Services suggested: No services needed at this time  Health Care Screening: Age-appropriate preventive services recommended by USPTF and ACIP covered by Medicare were discussed today. Services ordered if indicated and agreed upon by the patient.  Referrals offered: Community-based lifestyle interventions to reduce health risks and promote self-management and wellness, fall prevention, nutrition, physical activity, tobacco-use cessation, weight loss, and mental health services as per orders if indicated.    Discussion today about general wellness and lifestyle habits:    · Prevent falls and reduce trip hazards; Cautioned about securing or removing rugs.  · Have a working fire alarm and carbon monoxide detector;   · Engage in regular physical activity and social activities     Follow-up: Return in about 6 months (around 6/7/2021). call for lab slip

## 2020-12-07 NOTE — ASSESSMENT & PLAN NOTE
She has not had a dexa recently.  She is on ca++ and d otc.  No hx of fx.  She does not do weight bearing exercises but remains very active.  Will order dexa.

## 2020-12-08 DIAGNOSIS — R21 RASH: ICD-10-CM

## 2020-12-08 RX ORDER — TRETINOIN 0.5 MG/G
1 LOTION TOPICAL
Qty: 20 G | Refills: 1 | Status: SHIPPED | OUTPATIENT
Start: 2020-12-08 | End: 2021-09-24

## 2020-12-08 NOTE — TELEPHONE ENCOUNTER
"Please let pt know that the finger xray shows OA and the nodule is a \"soft tissue swelling\"  Nothing to do for treatment unless it is painful or bothering her.     "

## 2020-12-31 ENCOUNTER — HOSPITAL ENCOUNTER (OUTPATIENT)
Dept: LAB | Facility: MEDICAL CENTER | Age: 75
End: 2020-12-31
Attending: NURSE PRACTITIONER
Payer: MEDICARE

## 2020-12-31 DIAGNOSIS — E78.5 HYPERLIPIDEMIA LDL GOAL <130: ICD-10-CM

## 2020-12-31 LAB
ALBUMIN SERPL BCP-MCNC: 3.8 G/DL (ref 3.2–4.9)
ALBUMIN/GLOB SERPL: 1.4 G/DL
ALP SERPL-CCNC: 64 U/L (ref 30–99)
ALT SERPL-CCNC: 13 U/L (ref 2–50)
ANION GAP SERPL CALC-SCNC: 8 MMOL/L (ref 7–16)
AST SERPL-CCNC: 15 U/L (ref 12–45)
BILIRUB SERPL-MCNC: 0.5 MG/DL (ref 0.1–1.5)
BUN SERPL-MCNC: 19 MG/DL (ref 8–22)
CALCIUM SERPL-MCNC: 9.2 MG/DL (ref 8.5–10.5)
CHLORIDE SERPL-SCNC: 105 MMOL/L (ref 96–112)
CHOLEST SERPL-MCNC: 206 MG/DL (ref 100–199)
CO2 SERPL-SCNC: 27 MMOL/L (ref 20–33)
CREAT SERPL-MCNC: 0.82 MG/DL (ref 0.5–1.4)
FASTING STATUS PATIENT QL REPORTED: NORMAL
GLOBULIN SER CALC-MCNC: 2.8 G/DL (ref 1.9–3.5)
GLUCOSE SERPL-MCNC: 81 MG/DL (ref 65–99)
HDLC SERPL-MCNC: 71 MG/DL
LDLC SERPL CALC-MCNC: 123 MG/DL
POTASSIUM SERPL-SCNC: 4.6 MMOL/L (ref 3.6–5.5)
PROT SERPL-MCNC: 6.6 G/DL (ref 6–8.2)
SODIUM SERPL-SCNC: 140 MMOL/L (ref 135–145)
TRIGL SERPL-MCNC: 60 MG/DL (ref 0–149)

## 2020-12-31 PROCEDURE — 36415 COLL VENOUS BLD VENIPUNCTURE: CPT

## 2020-12-31 PROCEDURE — 80061 LIPID PANEL: CPT

## 2020-12-31 PROCEDURE — 80053 COMPREHEN METABOLIC PANEL: CPT

## 2021-01-02 ENCOUNTER — TELEPHONE (OUTPATIENT)
Dept: MEDICAL GROUP | Facility: MEDICAL CENTER | Age: 76
End: 2021-01-02

## 2021-01-04 DIAGNOSIS — E78.5 HYPERLIPIDEMIA LDL GOAL <130: ICD-10-CM

## 2021-01-04 RX ORDER — EZETIMIBE 10 MG/1
10 TABLET ORAL DAILY
Qty: 24 TAB | Refills: 3 | Status: SHIPPED | OUTPATIENT
Start: 2021-01-04 | End: 2021-01-28 | Stop reason: SDUPTHER

## 2021-01-15 DIAGNOSIS — Z23 NEED FOR VACCINATION: ICD-10-CM

## 2021-01-22 ENCOUNTER — IMMUNIZATION (OUTPATIENT)
Dept: FAMILY PLANNING/WOMEN'S HEALTH CLINIC | Facility: IMMUNIZATION CENTER | Age: 76
End: 2021-01-22
Attending: INTERNAL MEDICINE
Payer: MEDICARE

## 2021-01-22 DIAGNOSIS — Z23 ENCOUNTER FOR VACCINATION: Primary | ICD-10-CM

## 2021-01-22 DIAGNOSIS — Z23 NEED FOR VACCINATION: ICD-10-CM

## 2021-01-23 PROCEDURE — 91300 PFIZER SARS-COV-2 VACCINE: CPT

## 2021-01-23 PROCEDURE — 0001A PFIZER SARS-COV-2 VACCINE: CPT

## 2021-01-28 DIAGNOSIS — E78.5 HYPERLIPIDEMIA LDL GOAL <130: ICD-10-CM

## 2021-01-28 RX ORDER — EZETIMIBE 10 MG/1
10 TABLET ORAL DAILY
Qty: 24 TAB | Refills: 3 | Status: SHIPPED | OUTPATIENT
Start: 2021-01-28 | End: 2021-06-04

## 2021-01-28 NOTE — TELEPHONE ENCOUNTER
Ins requires 100 day supply please      Received request via: Pharmacy    Was the patient seen in the last year in this department? Yes    Does the patient have an active prescription (recently filled or refills available) for medication(s) requested? No

## 2021-02-13 ENCOUNTER — IMMUNIZATION (OUTPATIENT)
Dept: FAMILY PLANNING/WOMEN'S HEALTH CLINIC | Facility: IMMUNIZATION CENTER | Age: 76
End: 2021-02-13
Payer: MEDICARE

## 2021-02-13 DIAGNOSIS — Z23 ENCOUNTER FOR VACCINATION: Primary | ICD-10-CM

## 2021-02-13 PROCEDURE — 0002A PFIZER SARS-COV-2 VACCINE: CPT

## 2021-02-13 PROCEDURE — 91300 PFIZER SARS-COV-2 VACCINE: CPT

## 2021-05-11 DIAGNOSIS — E53.8 VITAMIN B12 DEFICIENCY: ICD-10-CM

## 2021-05-11 DIAGNOSIS — Z79.890 POSTMENOPAUSAL HRT (HORMONE REPLACEMENT THERAPY): ICD-10-CM

## 2021-05-11 DIAGNOSIS — E78.5 HYPERLIPIDEMIA LDL GOAL <130: ICD-10-CM

## 2021-05-11 NOTE — LETTER
May 11, 2021        Stephania Klinee Nilesh  [address]  [facility] [address]        Dear Stephania:    We have received a request from your pharmacy to refill your prescription(s). After careful review of your chart, we have noted you are due for labs and a follow up appointment.  We request you call our office at [phone] at your earliest convenience and make an appointment. I have included a fasting lab order for you.    However, when patients are not followed closely by their physician, potential medication complications may go unadressed. We look forward to scheduling an appointment for you, so that we may provide you with the safest and most complete medical care.        If you have any questions or concerns, please don't hesitate to call.        Sincerely,        OPAL Ogden.    Electronically Signed

## 2021-05-26 ENCOUNTER — APPOINTMENT (RX ONLY)
Dept: URBAN - METROPOLITAN AREA CLINIC 4 | Facility: CLINIC | Age: 76
Setting detail: DERMATOLOGY
End: 2021-05-26

## 2021-05-26 DIAGNOSIS — L82.1 OTHER SEBORRHEIC KERATOSIS: ICD-10-CM

## 2021-05-26 DIAGNOSIS — Z71.89 OTHER SPECIFIED COUNSELING: ICD-10-CM

## 2021-05-26 PROBLEM — D48.5 NEOPLASM OF UNCERTAIN BEHAVIOR OF SKIN: Status: ACTIVE | Noted: 2021-05-26

## 2021-05-26 PROCEDURE — ? COUNSELING

## 2021-05-26 PROCEDURE — 11102 TANGNTL BX SKIN SINGLE LES: CPT

## 2021-05-26 PROCEDURE — ? BIOPSY BY SHAVE METHOD

## 2021-05-26 ASSESSMENT — LOCATION ZONE DERM: LOCATION ZONE: FACE

## 2021-05-26 ASSESSMENT — LOCATION SIMPLE DESCRIPTION DERM: LOCATION SIMPLE: RIGHT CHEEK

## 2021-05-26 ASSESSMENT — LOCATION DETAILED DESCRIPTION DERM: LOCATION DETAILED: RIGHT INFERIOR PREAURICULAR CHEEK

## 2021-05-26 NOTE — PROCEDURE: MIPS QUALITY
Detail Level: Detailed
Quality 431: Preventive Care And Screening: Unhealthy Alcohol Use - Screening: Patient screened for unhealthy alcohol use using a single question and scores less than 2 times per year
Quality 138: Melanoma: Coordination Of Care: A treatment plan was communicated to the physicians providing continuing care within one month of diagnosis outlining: diagnosis, tumor thickness and a plan for surgery or alternate care.
Quality 110: Preventive Care And Screening: Influenza Immunization: Influenza immunization was not ordered or administered, reason not given
Quality 226: Preventive Care And Screening: Tobacco Use: Screening And Cessation Intervention: Patient screened for tobacco use and is an ex/non-smoker
Quality 111:Pneumonia Vaccination Status For Older Adults: Pneumococcal Vaccination Previously Received
Quality 397: Melanoma: Reporting: The pathology report includes a pT Category and statement on thickness and ulceration for pT1, mitotic rate.
Quality 137: Melanoma: Continuity Of Care - Recall System: Patient information entered into a recall system that includes: target date for the next exam specified AND a process to follow up with patients regarding missed or unscheduled appointments
Quality 130: Documentation Of Current Medications In The Medical Record: Current Medications Documented

## 2021-06-03 ENCOUNTER — HOSPITAL ENCOUNTER (OUTPATIENT)
Dept: LAB | Facility: MEDICAL CENTER | Age: 76
End: 2021-06-03
Attending: NURSE PRACTITIONER
Payer: MEDICARE

## 2021-06-03 DIAGNOSIS — E53.8 VITAMIN B12 DEFICIENCY: ICD-10-CM

## 2021-06-03 DIAGNOSIS — E78.5 HYPERLIPIDEMIA LDL GOAL <130: ICD-10-CM

## 2021-06-03 LAB
ALBUMIN SERPL BCP-MCNC: 3.9 G/DL (ref 3.2–4.9)
ALBUMIN/GLOB SERPL: 1.4 G/DL
ALP SERPL-CCNC: 65 U/L (ref 30–99)
ALT SERPL-CCNC: 13 U/L (ref 2–50)
ANION GAP SERPL CALC-SCNC: 7 MMOL/L (ref 7–16)
AST SERPL-CCNC: 18 U/L (ref 12–45)
BILIRUB SERPL-MCNC: 0.6 MG/DL (ref 0.1–1.5)
BUN SERPL-MCNC: 15 MG/DL (ref 8–22)
CALCIUM SERPL-MCNC: 9.5 MG/DL (ref 8.5–10.5)
CHLORIDE SERPL-SCNC: 104 MMOL/L (ref 96–112)
CHOLEST SERPL-MCNC: 212 MG/DL (ref 100–199)
CO2 SERPL-SCNC: 26 MMOL/L (ref 20–33)
CREAT SERPL-MCNC: 0.85 MG/DL (ref 0.5–1.4)
FASTING STATUS PATIENT QL REPORTED: NORMAL
GLOBULIN SER CALC-MCNC: 2.8 G/DL (ref 1.9–3.5)
GLUCOSE SERPL-MCNC: 84 MG/DL (ref 65–99)
HDLC SERPL-MCNC: 75 MG/DL
LDLC SERPL CALC-MCNC: 125 MG/DL
POTASSIUM SERPL-SCNC: 4.6 MMOL/L (ref 3.6–5.5)
PROT SERPL-MCNC: 6.7 G/DL (ref 6–8.2)
SODIUM SERPL-SCNC: 137 MMOL/L (ref 135–145)
TRIGL SERPL-MCNC: 62 MG/DL (ref 0–149)
VIT B12 SERPL-MCNC: 3418 PG/ML (ref 211–911)

## 2021-06-03 PROCEDURE — 82607 VITAMIN B-12: CPT

## 2021-06-03 PROCEDURE — 80061 LIPID PANEL: CPT

## 2021-06-03 PROCEDURE — 80053 COMPREHEN METABOLIC PANEL: CPT

## 2021-06-03 PROCEDURE — 36415 COLL VENOUS BLD VENIPUNCTURE: CPT

## 2021-06-10 ENCOUNTER — APPOINTMENT (OUTPATIENT)
Dept: MEDICAL GROUP | Facility: MEDICAL CENTER | Age: 76
End: 2021-06-10
Payer: MEDICARE

## 2021-07-12 ENCOUNTER — APPOINTMENT (OUTPATIENT)
Dept: MEDICAL GROUP | Facility: MEDICAL CENTER | Age: 76
End: 2021-07-12
Payer: MEDICARE

## 2021-08-30 DIAGNOSIS — E78.5 HYPERLIPIDEMIA LDL GOAL <130: ICD-10-CM

## 2021-08-30 RX ORDER — EZETIMIBE 10 MG/1
TABLET ORAL
Qty: 90 TABLET | Refills: 0 | Status: SHIPPED | OUTPATIENT
Start: 2021-08-30 | End: 2021-11-22

## 2021-08-31 ENCOUNTER — OFFICE VISIT (OUTPATIENT)
Dept: MEDICAL GROUP | Facility: MEDICAL CENTER | Age: 76
End: 2021-08-31
Payer: MEDICARE

## 2021-08-31 VITALS
OXYGEN SATURATION: 97 % | DIASTOLIC BLOOD PRESSURE: 72 MMHG | WEIGHT: 136 LBS | HEART RATE: 72 BPM | BODY MASS INDEX: 24.1 KG/M2 | TEMPERATURE: 98.2 F | HEIGHT: 63 IN | SYSTOLIC BLOOD PRESSURE: 130 MMHG

## 2021-08-31 DIAGNOSIS — R22.31 MASS OF RIGHT FINGER: ICD-10-CM

## 2021-08-31 DIAGNOSIS — E78.5 HYPERLIPIDEMIA LDL GOAL <130: ICD-10-CM

## 2021-08-31 DIAGNOSIS — H40.9 GLAUCOMA OF BOTH EYES, UNSPECIFIED GLAUCOMA TYPE: ICD-10-CM

## 2021-08-31 PROCEDURE — 99214 OFFICE O/P EST MOD 30 MIN: CPT | Performed by: NURSE PRACTITIONER

## 2021-08-31 ASSESSMENT — PATIENT HEALTH QUESTIONNAIRE - PHQ9: CLINICAL INTERPRETATION OF PHQ2 SCORE: 0

## 2021-08-31 NOTE — PROGRESS NOTES
Subjective:     Stephania Galloway is a 75 y.o. female who presents with hyperlipidemia.    HPI:   Seen in f/u for hhyperlipidemia.  She is feeling well.  Going to [place] in november.  She was on zetia 2x/wk.  However started having leg swelling and pain. She then dec to 1 tab 1x/wk and 1/2 tab 1x/wk.    She has been seeing ophth for glaucoma.  She is now on xalatan.  She was seeing a optomitrist.  She is now being referred to Dr Oli Mendoza.  Vision still intact.   She has a movable mass on PIP rt ring finger.  It is movalble.  Xray in past didn't see anything.    Reviewed lab with pt.  CMP, GFR, LP is wnl  b12 is high at 3400.  She has already stopped her b12 supplement.      Patient Active Problem List    Diagnosis Date Noted   • Glaucoma (increased eye pressure)    • Hyperlipidemia LDL goal <130 11/03/2016   • Vitamin D deficiency disease    • Osteopenia 10/07/2012   • History of melanoma 10/07/2012   • Vitamin B12 deficiency 10/04/2012   • Postmenopausal HRT (hormone replacement therapy) 10/04/2012   • Seasonal allergies 10/04/2012       Current medicines (including changes today)  Current Outpatient Medications   Medication Sig Dispense Refill   • ezetimibe (ZETIA) 10 MG Tab TAKE 1 TABLET BY MOUTH EVERY DAY 90 Tablet 0   • Non Formulary Request Apply 1 Application topically every day. DHEA/prog/test 10/50/3mg/gm apply 1 ml topically daily except for sundays. 1 Each 0   • Tretinoin (ALTRENO) 0.05 % Lotion Apply 1 Application topically every 7 days. 20 g 1   • Calcium Carb-Cholecalciferol (CALCIUM 1000 + D PO) Take  by mouth every day.     • Biotin 1000 MCG Chew Tab Chew.     • Estradiol 0.025 MG/24HR PATCH BIWEEKLY APPLY TO AFFETCED AREA 1 PATCH TO SKIN AS DIRECTED EVERY 72 HOURS. 24 Patch 3   • gabapentin (NEURONTIN) 100 MG Cap TAKE 1 CAPSULE BY MOUTH AT BEDTIME 90 Cap 3   • latanoprost (XALATAN) 0.005 % Solution      • Cholecalciferol (VITAMIN D3) 2000 UNIT Cap Take  by mouth.     • doxycycline (ORACEA) 40 MG  "capsule Take 1 Cap by mouth every morning. 30 Cap 3   • loratadine (CLARITIN) 10 MG TABS Take 10 mg by mouth every day.       No current facility-administered medications for this visit.       Allergies   Allergen Reactions   • Amoxicillin-Pot Clavulanate Hives   • Augmentin    • Statins [Hmg-Coa-R Inhibitors]      myalgias       ROS  Constitutional: Negative. Negative for fever, chills, weight loss, malaise/fatigue and diaphoresis.   HENT: Negative. Negative for hearing loss, ear pain, nosebleeds, congestion, sore throat, neck pain, tinnitus and ear discharge.   Respiratory: Negative. Negative for cough, hemoptysis, sputum production, shortness of breath, wheezing and stridor.   Cardiovascular: Negative. Negative for chest pain, palpitations, orthopnea, claudication, leg swelling and PND.   Gastrointestinal: Denies nausea, vomiting, diarrhea, constipation, heartburn, melena or hematochezia.  Genitourinary: Denies dysuria, hematuria, urinary incontinence, frequency or urgency.        Objective:     /72 (BP Location: Right arm, Patient Position: Sitting)   Pulse 72   Temp 36.8 °C (98.2 °F) (Temporal)   Ht 1.6 m (5' 3\")   Wt 61.7 kg (136 lb)   SpO2 97%  Body mass index is 24.09 kg/m².    Physical Exam:  Vitals reviewed.  Constitutional: Oriented to person, place, and time. appears well-developed and well-nourished. No distress.   Cardiovascular: Normal rate, regular rhythm, normal heart sounds and intact distal pulses. Exam reveals no gallop and no friction rub. No murmur heard. No carotid bruits.   Pulmonary/Chest: Effort normal and breath sounds normal. No stridor. No respiratory distress. no wheezes or rales. exhibits no tenderness.   Musculoskeletal: Normal range of motion. exhibits no edema. radha pedal pulses 2+.  Rt ring finger PIP movable rectanglar mass noted w/o erythema or pain.   Lymphadenopathy: No cervical or supraclavicular adenopathy.   Neurological: Alert and oriented to person, place, and " time. exhibits normal muscle tone.  Skin: Skin is warm and dry. No diaphoresis.   Psychiatric: Normal mood and affect. Behavior is normal.      Assessment and Plan:     The following treatment plan was discussed:    1. Hyperlipidemia LDL goal <130      stable on 1.5 tabs zetia/wk.  plan: repeat lab 1 yr.  call for lab slip.  f/u 12/21 for HRA   2. Mass of right finger  REFERRAL TO ORTHOPEDICS    refer ortho for eval of movable mass on rt ring finger.    3. Glaucoma of both eyes, unspecified glaucoma type      vision intact.  f/u with  ophth as sched         Followup: Return in about 3 months (around 11/30/2021), or for HRA.

## 2021-09-16 PROBLEM — M67.441 GANGLION CYST OF FINGER OF RIGHT HAND: Status: ACTIVE | Noted: 2021-09-16

## 2021-09-20 ENCOUNTER — HOSPITAL ENCOUNTER (OUTPATIENT)
Facility: MEDICAL CENTER | Age: 76
End: 2021-09-20
Attending: ANESTHESIOLOGY
Payer: MEDICARE

## 2021-09-20 DIAGNOSIS — Z12.31 ENCOUNTER FOR SCREENING MAMMOGRAM FOR MALIGNANT NEOPLASM OF BREAST: ICD-10-CM

## 2021-09-20 DIAGNOSIS — N95.9 POST MENOPAUSAL PROBLEMS: ICD-10-CM

## 2021-09-20 LAB — COVID ORDER STATUS COVID19: NORMAL

## 2021-09-20 PROCEDURE — U0003 INFECTIOUS AGENT DETECTION BY NUCLEIC ACID (DNA OR RNA); SEVERE ACUTE RESPIRATORY SYNDROME CORONAVIRUS 2 (SARS-COV-2) (CORONAVIRUS DISEASE [COVID-19]), AMPLIFIED PROBE TECHNIQUE, MAKING USE OF HIGH THROUGHPUT TECHNOLOGIES AS DESCRIBED BY CMS-2020-01-R: HCPCS

## 2021-09-20 PROCEDURE — U0005 INFEC AGEN DETEC AMPLI PROBE: HCPCS

## 2021-09-21 LAB
SARS-COV-2 RNA RESP QL NAA+PROBE: NOTDETECTED
SPECIMEN SOURCE: NORMAL

## 2021-11-09 ENCOUNTER — APPOINTMENT (RX ONLY)
Dept: URBAN - METROPOLITAN AREA CLINIC 4 | Facility: CLINIC | Age: 76
Setting detail: DERMATOLOGY
End: 2021-11-09

## 2021-11-09 DIAGNOSIS — Z71.89 OTHER SPECIFIED COUNSELING: ICD-10-CM

## 2021-11-09 DIAGNOSIS — L85.3 XEROSIS CUTIS: ICD-10-CM

## 2021-11-09 DIAGNOSIS — L82.1 OTHER SEBORRHEIC KERATOSIS: ICD-10-CM

## 2021-11-09 DIAGNOSIS — Z79.890 POSTMENOPAUSAL HRT (HORMONE REPLACEMENT THERAPY): ICD-10-CM

## 2021-11-09 DIAGNOSIS — D22 MELANOCYTIC NEVI: ICD-10-CM

## 2021-11-09 DIAGNOSIS — L81.4 OTHER MELANIN HYPERPIGMENTATION: ICD-10-CM

## 2021-11-09 DIAGNOSIS — L57.0 ACTINIC KERATOSIS: ICD-10-CM

## 2021-11-09 DIAGNOSIS — D17 BENIGN LIPOMATOUS NEOPLASM: ICD-10-CM

## 2021-11-09 DIAGNOSIS — D18.0 HEMANGIOMA: ICD-10-CM

## 2021-11-09 DIAGNOSIS — Z85.820 PERSONAL HISTORY OF MALIGNANT MELANOMA OF SKIN: ICD-10-CM

## 2021-11-09 DIAGNOSIS — L81.5 LEUKODERMA, NOT ELSEWHERE CLASSIFIED: ICD-10-CM

## 2021-11-09 PROBLEM — D18.01 HEMANGIOMA OF SKIN AND SUBCUTANEOUS TISSUE: Status: ACTIVE | Noted: 2021-11-09

## 2021-11-09 PROBLEM — D22.5 MELANOCYTIC NEVI OF TRUNK: Status: ACTIVE | Noted: 2021-11-09

## 2021-11-09 PROBLEM — D17.22 BENIGN LIPOMATOUS NEOPLASM OF SKIN AND SUBCUTANEOUS TISSUE OF LEFT ARM: Status: ACTIVE | Noted: 2021-11-09

## 2021-11-09 PROCEDURE — 17003 DESTRUCT PREMALG LES 2-14: CPT

## 2021-11-09 PROCEDURE — ? ADDITIONAL NOTES

## 2021-11-09 PROCEDURE — ? LIQUID NITROGEN

## 2021-11-09 PROCEDURE — 17000 DESTRUCT PREMALG LESION: CPT

## 2021-11-09 PROCEDURE — ? COUNSELING

## 2021-11-09 PROCEDURE — 99213 OFFICE O/P EST LOW 20 MIN: CPT | Mod: 25

## 2021-11-09 RX ORDER — ESTRADIOL 0.03 MG/D
FILM, EXTENDED RELEASE TRANSDERMAL
Qty: 24 PATCH | Refills: 3 | Status: SHIPPED | OUTPATIENT
Start: 2021-11-09 | End: 2022-02-02 | Stop reason: SDUPTHER

## 2021-11-09 ASSESSMENT — LOCATION SIMPLE DESCRIPTION DERM
LOCATION SIMPLE: CHEST
LOCATION SIMPLE: LEFT CHEEK
LOCATION SIMPLE: LEFT UPPER BACK
LOCATION SIMPLE: NOSE
LOCATION SIMPLE: LEFT FOREARM
LOCATION SIMPLE: RIGHT PRETIBIAL REGION
LOCATION SIMPLE: RIGHT TEMPLE
LOCATION SIMPLE: RIGHT ZYGOMA
LOCATION SIMPLE: LEFT PRETIBIAL REGION
LOCATION SIMPLE: RIGHT ANTERIOR NECK
LOCATION SIMPLE: RIGHT UPPER BACK
LOCATION SIMPLE: RIGHT AXILLARY VAULT

## 2021-11-09 ASSESSMENT — LOCATION ZONE DERM
LOCATION ZONE: ARM
LOCATION ZONE: AXILLAE
LOCATION ZONE: NOSE
LOCATION ZONE: TRUNK
LOCATION ZONE: FACE
LOCATION ZONE: NECK
LOCATION ZONE: LEG

## 2021-11-09 ASSESSMENT — LOCATION DETAILED DESCRIPTION DERM
LOCATION DETAILED: LEFT DISTAL PRETIBIAL REGION
LOCATION DETAILED: LEFT VENTRAL PROXIMAL FOREARM
LOCATION DETAILED: RIGHT LATERAL ZYGOMA
LOCATION DETAILED: NASAL SUPRATIP
LOCATION DETAILED: LEFT INFERIOR CENTRAL MALAR CHEEK
LOCATION DETAILED: RIGHT LATERAL TEMPLE
LOCATION DETAILED: RIGHT DISTAL PRETIBIAL REGION
LOCATION DETAILED: RIGHT SUPERIOR UPPER BACK
LOCATION DETAILED: MIDDLE STERNUM
LOCATION DETAILED: LEFT INFERIOR MEDIAL UPPER BACK
LOCATION DETAILED: RIGHT AXILLARY VAULT
LOCATION DETAILED: LOWER STERNUM
LOCATION DETAILED: LEFT PROXIMAL PRETIBIAL REGION
LOCATION DETAILED: RIGHT INFERIOR ANTERIOR NECK
LOCATION DETAILED: RIGHT PROXIMAL PRETIBIAL REGION

## 2021-11-09 NOTE — PROCEDURE: LIQUID NITROGEN
Show Aperture Variable?: Yes
Detail Level: Detailed
Number Of Freeze-Thaw Cycles: 2 freeze-thaw cycles
Post-Care Instructions: I reviewed with the patient in detail post-care instructions. Patient is to wear sunprotection, and avoid picking at any of the treated lesions. Pt may apply Vaseline to crusted or scabbing areas.
Consent: The patient's consent was obtained including but not limited to risks of crusting, scabbing, blistering, scarring, darker or lighter pigmentary change, recurrence, incomplete removal and infection.
Duration Of Freeze Thaw-Cycle (Seconds): 8
Render Note In Bullet Format When Appropriate: No

## 2021-11-09 NOTE — PROCEDURE: COUNSELING
Detail Level: Detailed
Detail Level: Generalized
Sunscreen Recommendations: Recommended broad spectrum inorganic or physical sunscreens primarily containing zinc oxide or titanium dioxide.
Detail Level: Zone

## 2021-11-09 NOTE — PROCEDURE: ADDITIONAL NOTES
Additional Notes: Advised patient to use CeraVe Moisturizing Cream 1-2 times daily, with one application within 3-5 minutes of showering.
Render Risk Assessment In Note?: no
Detail Level: Simple
Additional Notes: 1.9 cm and located on the left ventral forearm, will submit codes #83152 and #[id number] for prior authorization and patient will call to schedule if she elects to have lipoma treated with surgical excision.

## 2021-11-09 NOTE — PROCEDURE: MIPS QUALITY
Quality 431: Preventive Care And Screening: Unhealthy Alcohol Use - Screening: Patient not identified as an unhealthy alcohol user when screened for unhealthy alcohol use using a systematic screening method
Detail Level: Detailed
Quality 226: Preventive Care And Screening: Tobacco Use: Screening And Cessation Intervention: Patient screened for tobacco use and is an ex/non-smoker
Quality 111:Pneumonia Vaccination Status For Older Adults: Pneumococcal Vaccination Previously Received
Quality 130: Documentation Of Current Medications In The Medical Record: Current Medications Documented

## 2021-12-16 ENCOUNTER — OFFICE VISIT (OUTPATIENT)
Dept: MEDICAL GROUP | Facility: MEDICAL CENTER | Age: 76
End: 2021-12-16
Payer: MEDICARE

## 2021-12-16 VITALS
BODY MASS INDEX: 24.88 KG/M2 | WEIGHT: 140.4 LBS | DIASTOLIC BLOOD PRESSURE: 80 MMHG | OXYGEN SATURATION: 99 % | HEIGHT: 63 IN | SYSTOLIC BLOOD PRESSURE: 130 MMHG | HEART RATE: 66 BPM | TEMPERATURE: 97.5 F

## 2021-12-16 DIAGNOSIS — J30.2 SEASONAL ALLERGIES: ICD-10-CM

## 2021-12-16 DIAGNOSIS — Z85.820 HISTORY OF MELANOMA: ICD-10-CM

## 2021-12-16 DIAGNOSIS — E78.5 HYPERLIPIDEMIA LDL GOAL <130: ICD-10-CM

## 2021-12-16 DIAGNOSIS — E53.8 VITAMIN B12 DEFICIENCY: ICD-10-CM

## 2021-12-16 DIAGNOSIS — H25.9 AGE-RELATED CATARACT OF BOTH EYES, UNSPECIFIED AGE-RELATED CATARACT TYPE: ICD-10-CM

## 2021-12-16 DIAGNOSIS — H40.1230 LOW-TENSION GLAUCOMA OF BOTH EYES, UNSPECIFIED GLAUCOMA STAGE: ICD-10-CM

## 2021-12-16 DIAGNOSIS — M85.80 OSTEOPENIA, UNSPECIFIED LOCATION: ICD-10-CM

## 2021-12-16 DIAGNOSIS — E55.9 VITAMIN D DEFICIENCY: ICD-10-CM

## 2021-12-16 DIAGNOSIS — Z79.890 POSTMENOPAUSAL HRT (HORMONE REPLACEMENT THERAPY): ICD-10-CM

## 2021-12-16 PROCEDURE — G0439 PPPS, SUBSEQ VISIT: HCPCS | Performed by: NURSE PRACTITIONER

## 2021-12-16 ASSESSMENT — ACTIVITIES OF DAILY LIVING (ADL): BATHING_REQUIRES_ASSISTANCE: 0

## 2021-12-16 ASSESSMENT — ENCOUNTER SYMPTOMS: GENERAL WELL-BEING: GOOD

## 2021-12-16 ASSESSMENT — PATIENT HEALTH QUESTIONNAIRE - PHQ9: CLINICAL INTERPRETATION OF PHQ2 SCORE: 0

## 2021-12-16 NOTE — ASSESSMENT & PLAN NOTE
Stable and well controlled on zetia.  No s/e to med.  Taking med appro.  She takes 1 tab on monday and 1/2 on thurs.  More will cause myalgias and arthralgias

## 2021-12-16 NOTE — PROGRESS NOTES
HPI:  Stephania is a 76 y.o. here for Medicare Annual Wellness Visit  She is feeling well.  She recently thought that she was having UTI.  She had cipro and took it.  Sx resolved.      Glaucoma (increased eye pressure)  Vision intact.  Followed by ophth    Age-related cataract of both eyes  She is followed by Dr Wright.  Will have having cataract repair in april    History of melanoma  She just saw derm.  There was no melanoma.  Had some cryo therapy    Hyperlipidemia LDL goal <130  Stable and well controlled on zetia.  No s/e to med.  Taking med appro.  She takes 1 tab on monday and 1/2 on thurs.  More will cause myalgias and arthralgias    Osteopenia  Stable on ca++ and d3.  Not doing exercise.  Will do updated dexa soon    Postmenopausal HRT (hormone replacement therapy)  Stable on med.  Needs refills.     Seasonal allergies  Stable on otc zyrtec.  No recent infections.  occas nosebleeds.  Uses humdifier at nite.     Vitamin B12 deficiency  Stable on otc supplement.  Will be due updated lab in 6/22.      Vitamin D deficiency disease  Stable on otc supplement.        Patient Active Problem List    Diagnosis Date Noted   • Age-related cataract of both eyes 12/16/2021   • Glaucoma (increased eye pressure)    • Hyperlipidemia LDL goal <130 11/03/2016   • Vitamin D deficiency disease    • Osteopenia 10/07/2012   • History of melanoma 10/07/2012   • Vitamin B12 deficiency 10/04/2012   • Postmenopausal HRT (hormone replacement therapy) 10/04/2012   • Seasonal allergies 10/04/2012       Current Outpatient Medications   Medication Sig Dispense Refill   • Non Formulary Request Apply 1 Application topically every day. DHEA/prog/test 10/50/3mg/gm apply 1 ml topically daily except for sundays. 3 Each 3   • ezetimibe (ZETIA) 10 MG Tab TAKE 1 TABLET BY MOUTH EVERY DAY 90 Tablet 0   • Estradiol 0.025 MG/24HR PATCH BIWEEKLY APPLY TO AFFECTED AREA 1 PATCH TO SKIN AS DIRECTED EVERY 72 HOURS. 24 Patch 3   • Calcium  Carb-Cholecalciferol (CALCIUM 1000 + D PO) Take  by mouth every day.     • Biotin 1000 MCG Chew Tab Chew.     • latanoprost (XALATAN) 0.005 % Solution      • Cholecalciferol (VITAMIN D3) 2000 UNIT Cap Take  by mouth.     • loratadine (CLARITIN) 10 MG TABS Take 10 mg by mouth every day.       No current facility-administered medications for this visit.        Patient is taking medications as noted in medication list.  Current supplements as per medication list.     Allergies: Amoxicillin-pot clavulanate, Augmentin, and Statins [hmg-coa-r inhibitors]    Current social contact/activities:  Traveling, reading and cooking, book club, get together with friends.    Is patient current with immunizations? Yes.    She  reports that she has never smoked. She has never used smokeless tobacco. She reports current alcohol use. She reports that she does not use drugs.  Counseling given: Not Answered  Comment: only smokes socially        DPA/Advanced directive: Patient has Advanced Directive on file.     ROS:    Gait: Uses no assistive device   Ostomy: No   Other tubes: No   Amputations: No   Chronic oxygen use No   Last eye exam  10/2021  Wears hearing aids: No   : Denies any urinary leakage during the last 6 months  Review of Systems   Constitutional: Negative.  Negative for fever, chills, weight loss, malaise/fatigue and diaphoresis.   HENT: Negative.  Negative for hearing loss, ear pain, nosebleeds, congestion, sore throat, neck pain, tinnitus and ear discharge.    Eyes: Negative.  Negative for blurred vision, double vision, photophobia, pain, discharge and redness.   Respiratory: Negative.  Negative for cough, hemoptysis, sputum production, shortness of breath, wheezing and stridor.    Cardiovascular: Negative.  Negative for chest pain, palpitations, orthopnea, claudication, leg swelling and PND.   Gastrointestinal: Negative.  Negative for heartburn, nausea, vomiting, abdominal pain, diarrhea, constipation, blood in stool  and melena.   Genitourinary: Negative.  Negative for dysuria, urgency, frequency, incontinence, hematuria and flank pain.   Musculoskeletal: Negative.  Negative for myalgias, back pain, joint pain and falls.   Skin: Negative.  Negative for itching and rash.   Neurological: Negative.  Negative for dizziness, tingling, tremors, sensory change, speech change, focal weakness, seizures, loss of consciousness, weakness and headaches.   Endo/Heme/Allergies: Negative.  Negative for environmental allergies and polydipsia. Does not bruise/bleed easily.   Psychiatric/Behavioral: Negative.  Negative for depression, suicidal ideas, hallucinations, memory loss and substance abuse. The patient is not nervous/anxious and does not have insomnia.    All other systems reviewed and are negative.    Screening:    yes    Depression Screening    Little interest or pleasure in doing things?  0 - not at all  Feeling down, depressed, or hopeless? 0 - not at all  Patient Health Questionnaire Score: 0    If depressive symptoms identified deferred to follow up visit unless specifically addressed in assessment and plan.    Interpretation of PHQ-9 Total Score   Score Severity   1-4 No Depression   5-9 Mild Depression   10-14 Moderate Depression   15-19 Moderately Severe Depression   20-27 Severe Depression    Screening for Cognitive Impairment    Three Minute Recall (captain, garden, picture)  3/3    Curtis clock face with all 12 numbers and set the hands to show 5 past 8.  Yes    If cognitive concerns identified, deferred for follow up unless specifically addressed in assessment and plan.    Fall Risk Assessment    Has the patient had two or more falls in the last year or any fall with injury in the last year?  No  If fall risk identified, deferred for follow up unless specifically addressed in assessment and plan.    Safety Assessment    Throw rugs on floor.  No  Handrails on all stairs.  No  Good lighting in all hallways.  Yes  Difficulty  hearing.  No  Patient counseled about all safety risks that were identified.    Functional Assessment ADLs    Are there any barriers preventing you from cooking for yourself or meeting nutritional needs?  No.    Are there any barriers preventing you from driving safely or obtaining transportation?  No.    Are there any barriers preventing you from using a telephone or calling for help?  No.    Are there any barriers preventing you from shopping?  No.    Are there any barriers preventing you from taking care of your own finances?  No.    Are there any barriers preventing you from managing your medications?  No.    Are there any barriers preventing you from showering, bathing or dressing yourself?  No.    Are you currently engaging in any exercise or physical activity?  Yes.     What is your perception of your health?  Good.    Health Maintenance Summary          Overdue - HEPATITIS C SCREENING (Once) Overdue - never done    No completion history exists for this topic.          Scheduled - BONE DENSITY (Every 2 Years) Scheduled for 12/22/2021 11/04/2009  Done - L +1.2, H +1.0          Scheduled - MAMMOGRAM (Yearly) Scheduled for 12/22/2021 12/01/2020  MA-SCREENING MAMMO BILAT W/TOMOSYNTHESIS W/CAD    11/19/2019  MA-SCREENING MAMMO BILAT W/TOMOSYNTHESIS W/CAD    11/12/2018  MA-SCREENING MAMMO BILAT W/TOMOSYNTHESIS W/CAD    11/08/2017  MA-MAMMO SCREENING BILAT W/MURRAY W/CAD    11/03/2016  Done    Only the first 5 history entries have been loaded, but more history exists.          Annual Wellness Visit (Every 366 Days) Next due on 12/17/2022 12/16/2021  Done    12/07/2020  Subsequent Annual Wellness Visit - Includes PPPS ()    12/03/2019  Subsequent Annual Wellness Visit - Includes PPPS ()    11/19/2018  Done    11/08/2017  Done          IMM DTaP/Tdap/Td Vaccine (2 - Td or Tdap) Next due on 11/8/2027 11/08/2017  Imm Admin: Tdap Vaccine          COLORECTAL CANCER SCREENING (COLONOSCOPY - Every 10  Years) Tentatively due on 2/6/2030 02/06/2020  REFERRAL TO GI FOR COLONOSCOPY    10/11/2012  OCCULT BLOOD FECES IMMUNOASSAY          IMM PNEUMOCOCCAL VACCINE: 65+ Years (Series Information) Completed    01/27/2016  Imm Admin: Pneumococcal Conjugate Vaccine (Prevnar/PCV-13)    12/16/2015  Imm Admin: Pneumococcal Conjugate Vaccine (Prevnar/PCV-13)    10/16/2012  Imm Admin: Pneumococcal polysaccharide vaccine (PPSV-23)          IMM ZOSTER VACCINES (Series Information) Completed    12/02/2020  Imm Admin: Zoster Vaccine Recombinant (RZV) (SHINGRIX)    08/04/2020  Imm Admin: Zoster Vaccine Recombinant (RZV) (SHINGRIX)    02/01/2007  Imm Admin: Zoster Vaccine Live (ZVL) (Zostavax) - HISTORICAL DATA          IMM INFLUENZA (Series Information) Completed    09/11/2021  Imm Admin: Influenza Vaccine Adult HD    08/17/2020  Imm Admin: Influenza Vaccine Adult HD    09/24/2019  Imm Admin: Influenza Vaccine Adult HD    10/08/2018  Imm Admin: Influenza, Unspecified - HISTORICAL DATA    09/11/2017  Imm Admin: Influenza Vaccine Adult HD    Only the first 5 history entries have been loaded, but more history exists.          COVID-19 Vaccine (Series Information) Completed    09/20/2021  Imm Admin: Pfizer SARS-CoV-2 Vaccine 12+    09/20/2021  Imm Admin: Pfizer SARS-CoV-2 Vaccine    02/13/2021  Imm Admin: Pfizer SARS-CoV-2 Vaccine    01/23/2021  Imm Admin: Pfizer SARS-CoV-2 Vaccine          IMM HEP B VACCINE (Series Information) Aged Out    No completion history exists for this topic.          IMM MENINGOCOCCAL VACCINE (MCV4) (Series Information) Aged Out    No completion history exists for this topic.                Patient Care Team:  DANIEL Ogden as PCP - General (Family Medicine)  Vitaly Alvarado O.D. as Consulting Physician (Optometry)  DANIEL Ashley (Dermatology)    Social History     Tobacco Use   • Smoking status: Never Smoker   • Smokeless tobacco: Never Used   • Tobacco comment: only smokes socially  "  Vaping Use   • Vaping Use: Never used   Substance Use Topics   • Alcohol use: Yes     Alcohol/week: 0.0 oz     Comment: very rarely, social drinking   • Drug use: No     Family History   Problem Relation Age of Onset   • Cancer Father         colon cancer dx at age ?   • Cancer Paternal Grandfather 80        colon cancer    • Heart Disease Mother    • Stroke Mother    • Hypertension Mother    • Cancer Maternal Grandmother         breast   • Lupus Sister      She  has a past medical history of Age-related cataract of both eyes (12/16/2021), Allergic rhinitis due to allergen, B12 deficiency, Glaucoma (increased eye pressure), History of melanoma, Hormone replacement therapy (postmenopausal), Hyperlipemia, Osteopenia, and Vitamin D deficiency disease.   Past Surgical History:   Procedure Laterality Date   • PB EXCIS TENDON SHEATH LESION, HAND/FINGER Right 9/24/2021    Procedure: RIGHT RING FINGER CYST EXCISION, REPAIRS AS INDICATED;  Surgeon: Silva Roberts M.D.;  Location: [facility];  Service: Orthopedics           Exam:     /80 (BP Location: Right arm, Patient Position: Sitting)   Pulse 66   Temp 36.4 °C (97.5 °F) (Temporal)   Ht 1.6 m (5' 3\")   Wt 63.7 kg (140 lb 6.4 oz)   SpO2 99%  Body mass index is 24.87 kg/m².  Hearing good.    Dentition none  Alert, oriented in no acute distress.  Eye contact is good, speech goal directed, affect calm  Physical Exam   Vitals reviewed.  Constitutional: oriented to person, place, and time. appears well-developed and well-nourished. No distress.   HENT: Head: Normocephalic and atraumatic. Bilateral tympanic membranes wnl w/o bulging.  Right Ear: External ear normal. Left Ear: External ear normal. Nose: Nose normal.  Mouth/Throat: Oropharynx is clear and moist. No oropharyngeal exudate. radha tm wnl. Eyes: Conjunctivae and EOM are normal. Pupils are equal, round, and reactive to light. Right eye exhibits no discharge. Left eye exhibits no " discharge. No scleral icterus.    Neck: Normal range of motion. Neck supple. No JVD present.   Cardiovascular: Normal rate, regular rhythm, normal heart sounds and intact distal pulses.  Exam reveals no gallop and no friction rub.  No murmur heard.  No carotid bruits   Pulmonary/Chest: Effort normal and breath sounds normal. No stridor. No respiratory distress. no wheezes or rales. exhibits no tenderness.   Abdominal: Soft. Bowel sounds are normal. exhibits no distension and no mass. No tenderness. no rebound and no guarding.   Musculoskeletal: Normal range of motion. exhibits no edema or tenderness.  radha pedal pulses 2+.  Lymphadenopathy:  no cervical or supraclavicular adenopathy.   Neurological: alert and oriented to person, place, and time. has normal reflexes. displays normal reflexes. No cranial nerve deficit. exhibits normal muscle tone. Coordination normal.   Skin: Skin is warm and dry. No rash noted. no diaphoresis. No erythema. No pallor.   Psychiatric: normal mood and affect. behavior is normal.     Assessment and Plan. The following treatment and monitoring plan is recommended:    1. Hyperlipidemia LDL goal <130      stable on 1.5 tabs/wk zetia.  last LP stable.  will repeat lab 6/22.  f/u for review   2. Age-related cataract of both eyes, unspecified age-related cataract type      will be having surgery to repair in april.  followed by ophth   3. History of melanoma      has seen derm.  no current issues    4. Osteopenia, unspecified location      sched for updated mammo & dexa.  f/u w/pt w/results.  continue ca++, d3 otc.  enc pt to do weight bearing exercises.   5. Seasonal allergies      stable w/o sx of infection.  using otc meds   6. Vitamin B12 deficiency      stable and well controlled on otc supplement   7. Vitamin D deficiency disease      stable and well controlled on otc supplement   8. Postmenopausal HRT (hormone replacement therapy)  Non Formulary Request    stable on meds.  refilled meds.    9. Low-tension glaucoma of both eyes, unspecified glaucoma stage      stable and followed by ophth.  vision intact         Services suggested: No services needed at this time  Health Care Screening recommendations as per orders if indicated.  Referrals offered: PT/OT/Nutrition counseling/Behavioral Health/Smoking cessation as per orders if indicated.    Discussion today about general wellness and lifestyle habits:    · Prevent falls and reduce trip hazards; Cautioned about securing or removing rugs.  · Have a working fire alarm and carbon monoxide detector;   · Engage in regular physical activity and social activities       Follow-up: Return in about 6 months (around 6/16/2022).

## 2021-12-22 ENCOUNTER — TELEPHONE (OUTPATIENT)
Dept: MEDICAL GROUP | Facility: MEDICAL CENTER | Age: 76
End: 2021-12-22

## 2021-12-22 ENCOUNTER — HOSPITAL ENCOUNTER (OUTPATIENT)
Dept: RADIOLOGY | Facility: MEDICAL CENTER | Age: 76
End: 2021-12-22
Attending: NURSE PRACTITIONER
Payer: MEDICARE

## 2021-12-22 DIAGNOSIS — N95.9 POST MENOPAUSAL PROBLEMS: ICD-10-CM

## 2021-12-22 DIAGNOSIS — Z12.31 ENCOUNTER FOR SCREENING MAMMOGRAM FOR MALIGNANT NEOPLASM OF BREAST: ICD-10-CM

## 2021-12-22 PROCEDURE — 77063 BREAST TOMOSYNTHESIS BI: CPT

## 2021-12-22 PROCEDURE — 77080 DXA BONE DENSITY AXIAL: CPT

## 2022-01-27 DIAGNOSIS — Z79.890 POSTMENOPAUSAL HRT (HORMONE REPLACEMENT THERAPY): ICD-10-CM

## 2022-02-02 DIAGNOSIS — Z79.890 POSTMENOPAUSAL HRT (HORMONE REPLACEMENT THERAPY): ICD-10-CM

## 2022-02-02 RX ORDER — ESTRADIOL 0.03 MG/D
FILM, EXTENDED RELEASE TRANSDERMAL
Qty: 24 PATCH | Refills: 3 | Status: SHIPPED | OUTPATIENT
Start: 2022-02-02 | End: 2022-02-03 | Stop reason: SDUPTHER

## 2022-02-03 DIAGNOSIS — Z79.890 POSTMENOPAUSAL HRT (HORMONE REPLACEMENT THERAPY): ICD-10-CM

## 2022-02-03 RX ORDER — ESTRADIOL 0.03 MG/D
FILM, EXTENDED RELEASE TRANSDERMAL
Qty: 24 PATCH | Refills: 3 | Status: SHIPPED | OUTPATIENT
Start: 2022-02-03 | End: 2022-12-22 | Stop reason: SDUPTHER

## 2022-06-23 ENCOUNTER — HOSPITAL ENCOUNTER (OUTPATIENT)
Dept: LAB | Facility: MEDICAL CENTER | Age: 77
End: 2022-06-23
Attending: NURSE PRACTITIONER
Payer: MEDICARE

## 2022-06-23 DIAGNOSIS — E53.8 VITAMIN B12 DEFICIENCY: ICD-10-CM

## 2022-06-23 DIAGNOSIS — E78.5 HYPERLIPIDEMIA LDL GOAL <130: ICD-10-CM

## 2022-06-23 LAB
ALBUMIN SERPL BCP-MCNC: 3.9 G/DL (ref 3.2–4.9)
ALBUMIN/GLOB SERPL: 1.2 G/DL
ALP SERPL-CCNC: 74 U/L (ref 30–99)
ALT SERPL-CCNC: 10 U/L (ref 2–50)
ANION GAP SERPL CALC-SCNC: 14 MMOL/L (ref 7–16)
AST SERPL-CCNC: 15 U/L (ref 12–45)
BILIRUB SERPL-MCNC: 0.4 MG/DL (ref 0.1–1.5)
BUN SERPL-MCNC: 18 MG/DL (ref 8–22)
CALCIUM SERPL-MCNC: 9.1 MG/DL (ref 8.5–10.5)
CHLORIDE SERPL-SCNC: 105 MMOL/L (ref 96–112)
CHOLEST SERPL-MCNC: 201 MG/DL (ref 100–199)
CO2 SERPL-SCNC: 22 MMOL/L (ref 20–33)
CREAT SERPL-MCNC: 0.81 MG/DL (ref 0.5–1.4)
FASTING STATUS PATIENT QL REPORTED: NORMAL
GFR SERPLBLD CREATININE-BSD FMLA CKD-EPI: 76 ML/MIN/1.73 M 2
GLOBULIN SER CALC-MCNC: 3.2 G/DL (ref 1.9–3.5)
GLUCOSE SERPL-MCNC: 80 MG/DL (ref 65–99)
HDLC SERPL-MCNC: 64 MG/DL
LDLC SERPL CALC-MCNC: 121 MG/DL
POTASSIUM SERPL-SCNC: 4.4 MMOL/L (ref 3.6–5.5)
PROT SERPL-MCNC: 7.1 G/DL (ref 6–8.2)
SODIUM SERPL-SCNC: 141 MMOL/L (ref 135–145)
TRIGL SERPL-MCNC: 81 MG/DL (ref 0–149)
VIT B12 SERPL-MCNC: 2346 PG/ML (ref 211–911)

## 2022-06-23 PROCEDURE — 82607 VITAMIN B-12: CPT

## 2022-06-23 PROCEDURE — 36415 COLL VENOUS BLD VENIPUNCTURE: CPT

## 2022-06-23 PROCEDURE — 80061 LIPID PANEL: CPT

## 2022-06-23 PROCEDURE — 80053 COMPREHEN METABOLIC PANEL: CPT

## 2022-06-24 ENCOUNTER — TELEPHONE (OUTPATIENT)
Dept: MEDICAL GROUP | Facility: MEDICAL CENTER | Age: 77
End: 2022-06-24
Payer: COMMERCIAL

## 2022-06-24 NOTE — TELEPHONE ENCOUNTER
----- Message from DANIEL Ogden sent at 6/23/2022  2:41 PM PDT -----  Please have pt set appointment to review and discuss treatment for labs.

## 2022-06-24 NOTE — LETTER
June 24, 2022        Stephania Galloway  [id number] [facility] [id number]        Dorcas Pack has received your most recent lab work and would like you to make an appointment to be seen either in office or virtually to go over your labs results. Please call [phone] to schedule an appointment or you may schedule via My chart.         Thank you,                                   Nichole Nowak, Med Ass't

## 2022-06-30 ENCOUNTER — OFFICE VISIT (OUTPATIENT)
Dept: MEDICAL GROUP | Facility: MEDICAL CENTER | Age: 77
End: 2022-06-30
Payer: MEDICARE

## 2022-06-30 VITALS
TEMPERATURE: 98.2 F | OXYGEN SATURATION: 95 % | RESPIRATION RATE: 12 BRPM | BODY MASS INDEX: 23.92 KG/M2 | SYSTOLIC BLOOD PRESSURE: 120 MMHG | HEART RATE: 86 BPM | HEIGHT: 63 IN | WEIGHT: 135 LBS | DIASTOLIC BLOOD PRESSURE: 64 MMHG

## 2022-06-30 DIAGNOSIS — R09.81 SINUS CONGESTION: ICD-10-CM

## 2022-06-30 DIAGNOSIS — E78.5 HYPERLIPIDEMIA LDL GOAL <130: ICD-10-CM

## 2022-06-30 DIAGNOSIS — R79.89 HIGH SERUM VITAMIN B12: ICD-10-CM

## 2022-06-30 PROCEDURE — 99214 OFFICE O/P EST MOD 30 MIN: CPT | Performed by: NURSE PRACTITIONER

## 2022-06-30 RX ORDER — MONTELUKAST SODIUM 10 MG/1
10 TABLET ORAL DAILY
Qty: 30 TABLET | Refills: 3 | Status: SHIPPED | OUTPATIENT
Start: 2022-06-30 | End: 2022-07-24

## 2022-06-30 ASSESSMENT — PATIENT HEALTH QUESTIONNAIRE - PHQ9: CLINICAL INTERPRETATION OF PHQ2 SCORE: 0

## 2022-06-30 NOTE — PROGRESS NOTES
Subjective:     Stephania Galloway is a 76 y.o. female who presents with hyperlipidemia.    HPI:   Seen in f/u for hyperlipidemia.  She is feeling  About 3 wks ago she woke up with chest tightness and could not breathe.  She is still having congestion from that.  She went to .  She took doxy and rescue inhaler.  She having some SOB w/o wheezing.  She was also having sinus congestion.  Secretions are yellow but clearing.  Able to more easily deep breathe but still some issues.  She is on zyrtec.   Reviewed lab with pt.  CMP, GFR is at goal.  B12 continues to be high but down from previous.  Was up to 3418 on daily b12 1000 mcg.  Dec to every other day but now only down to 2346.    LP shows trg and HDL at goal.  LDL is stable at 121.  Goal is <130.  She is stable on zetia but can only take 1.5 tabs weekly or her thighs will swell.     Patient Active Problem List    Diagnosis Date Noted   • Age-related cataract of both eyes 12/16/2021   • Glaucoma (increased eye pressure)    • Hyperlipidemia LDL goal <130 11/03/2016   • Vitamin D deficiency disease    • Osteopenia 10/07/2012   • History of melanoma 10/07/2012   • Vitamin B12 deficiency 10/04/2012   • Postmenopausal HRT (hormone replacement therapy) 10/04/2012   • Seasonal allergies 10/04/2012       Current medicines (including changes today)  Current Outpatient Medications   Medication Sig Dispense Refill   • montelukast (SINGULAIR) 10 MG Tab Take 1 Tablet by mouth every day. 30 Tablet 3   • ezetimibe (ZETIA) 10 MG Tab TAKE 1 TABLET BY MOUTH EVERY DAY 90 Tablet 1   • Estradiol 0.025 MG/24HR PATCH BIWEEKLY APPLY TO AFFECTED AREA 1 PATCH TO SKIN AS DIRECTED EVERY 72 HOURS. 24 Patch 3   • Non Formulary Request Apply 1 Application topically every day. DHEA/prog/test 10/50/3mg/gm apply 1 ml topically daily except for sundays. 3 Each 3   • Calcium Carb-Cholecalciferol (CALCIUM 1000 + D PO) Take  by mouth every day.     • latanoprost (XALATAN) 0.005 % Solution      •  "loratadine (CLARITIN) 10 MG TABS Take 10 mg by mouth every day.       No current facility-administered medications for this visit.       Allergies   Allergen Reactions   • Amoxicillin-Pot Clavulanate Hives   • Augmentin    • Statins [Hmg-Coa-R Inhibitors]      myalgias       ROS  Constitutional: Negative. Negative for fever, chills, weight loss, malaise/fatigue and diaphoresis.   HENT: Negative. Negative for hearing loss, ear pain, nosebleeds, congestion, sore throat, neck pain, tinnitus and ear discharge.   Respiratory: Negative. Negative for cough, hemoptysis, sputum production, shortness of breath, wheezing and stridor.   Cardiovascular: Negative. Negative for chest pain, palpitations, orthopnea, claudication, leg swelling and PND.   Gastrointestinal: Denies nausea, vomiting, diarrhea, constipation, heartburn, melena or hematochezia.  Genitourinary: Denies dysuria, hematuria, urinary incontinence, frequency or urgency.        Objective:     /64   Pulse 86   Temp 36.8 °C (98.2 °F) (Temporal)   Resp 12   Ht 1.6 m (5' 3\")   Wt 61.2 kg (135 lb)   SpO2 95%  Body mass index is 23.91 kg/m².    Physical Exam:  Vitals reviewed.  Constitutional: Oriented to person, place, and time. appears well-developed and well-nourished. No distress.   Cardiovascular: Normal rate, regular rhythm, normal heart sounds and intact distal pulses. Exam reveals no gallop and no friction rub. No murmur heard. No carotid bruits.   Pulmonary/Chest: Effort normal and breath sounds normal. No stridor. No respiratory distress. no wheezes or rales. exhibits no tenderness.   Musculoskeletal: Normal range of motion. exhibits no edema. radha pedal pulses 2+.  Lymphadenopathy: No cervical or supraclavicular adenopathy.   Neurological: Alert and oriented to person, place, and time. exhibits normal muscle tone.  Skin: Skin is warm and dry. No diaphoresis.   Psychiatric: Normal mood and affect. Behavior is normal.      Assessment and Plan:     The " following treatment plan was discussed:    1. Sinus congestion  montelukast (SINGULAIR) 10 MG Tab    continue zyrtec, add singulair.  f/u if sx do not continue to improve   2. High serum vitamin B12  VITAMIN B12    go to every 3rd day on b12 otc 1000 mcg   3. Hyperlipidemia LDL goal <130  Lipid Profile    stable and controlled on healthy diet, regular exercise and zetia.  plan:  f/u for HRA 12/22.  do lab before appt         Followup: Return in about 6 months (around 12/30/2022).

## 2022-08-20 DIAGNOSIS — E78.5 HYPERLIPIDEMIA LDL GOAL <130: ICD-10-CM

## 2022-08-21 RX ORDER — EZETIMIBE 10 MG/1
TABLET ORAL
Qty: 90 TABLET | Refills: 1 | Status: SHIPPED | OUTPATIENT
Start: 2022-08-21 | End: 2022-12-22 | Stop reason: SDUPTHER

## 2022-11-09 ENCOUNTER — PATIENT MESSAGE (OUTPATIENT)
Dept: HEALTH INFORMATION MANAGEMENT | Facility: OTHER | Age: 77
End: 2022-11-09

## 2022-11-15 ENCOUNTER — APPOINTMENT (RX ONLY)
Dept: URBAN - METROPOLITAN AREA CLINIC 4 | Facility: CLINIC | Age: 77
Setting detail: DERMATOLOGY
End: 2022-11-15

## 2022-11-15 DIAGNOSIS — L73.9 FOLLICULAR DISORDER, UNSPECIFIED: ICD-10-CM

## 2022-11-15 DIAGNOSIS — L81.4 OTHER MELANIN HYPERPIGMENTATION: ICD-10-CM

## 2022-11-15 DIAGNOSIS — L738 OTHER SPECIFIED DISEASES OF HAIR AND HAIR FOLLICLES: ICD-10-CM

## 2022-11-15 DIAGNOSIS — D18.0 HEMANGIOMA: ICD-10-CM

## 2022-11-15 DIAGNOSIS — Z85.820 PERSONAL HISTORY OF MALIGNANT MELANOMA OF SKIN: ICD-10-CM

## 2022-11-15 DIAGNOSIS — L663 OTHER SPECIFIED DISEASES OF HAIR AND HAIR FOLLICLES: ICD-10-CM

## 2022-11-15 DIAGNOSIS — L82.0 INFLAMED SEBORRHEIC KERATOSIS: ICD-10-CM

## 2022-11-15 DIAGNOSIS — L82.1 OTHER SEBORRHEIC KERATOSIS: ICD-10-CM

## 2022-11-15 DIAGNOSIS — D22 MELANOCYTIC NEVI: ICD-10-CM

## 2022-11-15 PROBLEM — D48.5 NEOPLASM OF UNCERTAIN BEHAVIOR OF SKIN: Status: ACTIVE | Noted: 2022-11-15

## 2022-11-15 PROBLEM — D22.5 MELANOCYTIC NEVI OF TRUNK: Status: ACTIVE | Noted: 2022-11-15

## 2022-11-15 PROBLEM — D18.01 HEMANGIOMA OF SKIN AND SUBCUTANEOUS TISSUE: Status: ACTIVE | Noted: 2022-11-15

## 2022-11-15 PROBLEM — L02.02 FURUNCLE OF FACE: Status: ACTIVE | Noted: 2022-11-15

## 2022-11-15 PROCEDURE — 17110 DESTRUCTION B9 LES UP TO 14: CPT

## 2022-11-15 PROCEDURE — ? ADDITIONAL NOTES

## 2022-11-15 PROCEDURE — 11102 TANGNTL BX SKIN SINGLE LES: CPT | Mod: 59

## 2022-11-15 PROCEDURE — ? BIOPSY BY SHAVE METHOD

## 2022-11-15 PROCEDURE — ? LIQUID NITROGEN

## 2022-11-15 PROCEDURE — ? COUNSELING

## 2022-11-15 PROCEDURE — 99213 OFFICE O/P EST LOW 20 MIN: CPT | Mod: 25

## 2022-11-15 PROCEDURE — ? OBSERVATION

## 2022-11-15 ASSESSMENT — LOCATION DETAILED DESCRIPTION DERM
LOCATION DETAILED: RIGHT BUTTOCK
LOCATION DETAILED: NASAL DORSUM
LOCATION DETAILED: RIGHT SUPERIOR UPPER BACK
LOCATION DETAILED: RIGHT ANTERIOR DISTAL THIGH
LOCATION DETAILED: RIGHT AXILLARY VAULT
LOCATION DETAILED: LEFT LATERAL ABDOMEN
LOCATION DETAILED: LEFT RIB CAGE

## 2022-11-15 ASSESSMENT — LOCATION SIMPLE DESCRIPTION DERM
LOCATION SIMPLE: ABDOMEN
LOCATION SIMPLE: RIGHT UPPER BACK
LOCATION SIMPLE: RIGHT THIGH
LOCATION SIMPLE: RIGHT AXILLARY VAULT
LOCATION SIMPLE: RIGHT BUTTOCK
LOCATION SIMPLE: NOSE

## 2022-11-15 ASSESSMENT — LOCATION ZONE DERM
LOCATION ZONE: TRUNK
LOCATION ZONE: LEG
LOCATION ZONE: AXILLAE
LOCATION ZONE: NOSE

## 2022-11-15 NOTE — PROCEDURE: LIQUID NITROGEN
Consent: The patient's consent was obtained including but not limited to risks of crusting, scabbing, blistering, scarring, darker or lighter pigmentary change, recurrence, incomplete removal and infection.
Include Z78.9 (Other Specified Conditions Influencing Health Status) As An Associated Diagnosis?: No
Show Aperture Variable?: Yes
Medical Necessity Clause: This procedure was medically necessary because the lesions that were treated were:
Post-Care Instructions: I reviewed with the patient in detail post-care instructions. Patient is to wear sunprotection, and avoid picking at any of the treated lesions. Pt may apply Vaseline to crusted or scabbing areas.
Detail Level: Detailed
Medical Necessity Information: It is in your best interest to select a reason for this procedure from the list below. All of these items fulfill various CMS LCD requirements except the new and changing color options.
Spray Paint Text: The liquid nitrogen was applied to the skin utilizing a spray paint frosting technique.

## 2022-11-15 NOTE — PROCEDURE: ADDITIONAL NOTES
Detail Level: Detailed
Additional Notes: Possible Basal cell, pt is going to watch it to make sure it heals
Render Risk Assessment In Note?: no

## 2022-12-17 ENCOUNTER — HOSPITAL ENCOUNTER (OUTPATIENT)
Dept: LAB | Facility: MEDICAL CENTER | Age: 77
End: 2022-12-17
Attending: NURSE PRACTITIONER
Payer: MEDICARE

## 2022-12-17 DIAGNOSIS — E78.5 HYPERLIPIDEMIA LDL GOAL <130: ICD-10-CM

## 2022-12-17 DIAGNOSIS — R79.89 HIGH SERUM VITAMIN B12: ICD-10-CM

## 2022-12-17 LAB
CHOLEST SERPL-MCNC: 221 MG/DL (ref 100–199)
FASTING STATUS PATIENT QL REPORTED: NORMAL
HDLC SERPL-MCNC: 84 MG/DL
LDLC SERPL CALC-MCNC: 121 MG/DL
TRIGL SERPL-MCNC: 79 MG/DL (ref 0–149)
VIT B12 SERPL-MCNC: 3493 PG/ML (ref 211–911)

## 2022-12-17 PROCEDURE — 36415 COLL VENOUS BLD VENIPUNCTURE: CPT

## 2022-12-17 PROCEDURE — 80061 LIPID PANEL: CPT

## 2022-12-17 PROCEDURE — 82607 VITAMIN B-12: CPT

## 2022-12-19 SDOH — ECONOMIC STABILITY: TRANSPORTATION INSECURITY
IN THE PAST 12 MONTHS, HAS THE LACK OF TRANSPORTATION KEPT YOU FROM MEDICAL APPOINTMENTS OR FROM GETTING MEDICATIONS?: NO

## 2022-12-19 SDOH — HEALTH STABILITY: PHYSICAL HEALTH: ON AVERAGE, HOW MANY DAYS PER WEEK DO YOU ENGAGE IN MODERATE TO STRENUOUS EXERCISE (LIKE A BRISK WALK)?: 2 DAYS

## 2022-12-19 SDOH — ECONOMIC STABILITY: TRANSPORTATION INSECURITY
IN THE PAST 12 MONTHS, HAS LACK OF RELIABLE TRANSPORTATION KEPT YOU FROM MEDICAL APPOINTMENTS, MEETINGS, WORK OR FROM GETTING THINGS NEEDED FOR DAILY LIVING?: NO

## 2022-12-19 SDOH — ECONOMIC STABILITY: INCOME INSECURITY: IN THE LAST 12 MONTHS, WAS THERE A TIME WHEN YOU WERE NOT ABLE TO PAY THE MORTGAGE OR RENT ON TIME?: NO

## 2022-12-19 SDOH — ECONOMIC STABILITY: HOUSING INSECURITY
IN THE LAST 12 MONTHS, WAS THERE A TIME WHEN YOU DID NOT HAVE A STEADY PLACE TO SLEEP OR SLEPT IN A SHELTER (INCLUDING NOW)?: NO

## 2022-12-19 SDOH — ECONOMIC STABILITY: FOOD INSECURITY: WITHIN THE PAST 12 MONTHS, YOU WORRIED THAT YOUR FOOD WOULD RUN OUT BEFORE YOU GOT MONEY TO BUY MORE.: NEVER TRUE

## 2022-12-19 SDOH — ECONOMIC STABILITY: HOUSING INSECURITY: IN THE LAST 12 MONTHS, HOW MANY PLACES HAVE YOU LIVED?: 1

## 2022-12-19 SDOH — ECONOMIC STABILITY: FOOD INSECURITY: WITHIN THE PAST 12 MONTHS, THE FOOD YOU BOUGHT JUST DIDN'T LAST AND YOU DIDN'T HAVE MONEY TO GET MORE.: NEVER TRUE

## 2022-12-19 SDOH — HEALTH STABILITY: MENTAL HEALTH
STRESS IS WHEN SOMEONE FEELS TENSE, NERVOUS, ANXIOUS, OR CAN'T SLEEP AT NIGHT BECAUSE THEIR MIND IS TROUBLED. HOW STRESSED ARE YOU?: ONLY A LITTLE

## 2022-12-19 SDOH — HEALTH STABILITY: PHYSICAL HEALTH: ON AVERAGE, HOW MANY MINUTES DO YOU ENGAGE IN EXERCISE AT THIS LEVEL?: 20 MIN

## 2022-12-19 SDOH — ECONOMIC STABILITY: TRANSPORTATION INSECURITY
IN THE PAST 12 MONTHS, HAS LACK OF TRANSPORTATION KEPT YOU FROM MEETINGS, WORK, OR FROM GETTING THINGS NEEDED FOR DAILY LIVING?: NO

## 2022-12-19 SDOH — ECONOMIC STABILITY: INCOME INSECURITY: HOW HARD IS IT FOR YOU TO PAY FOR THE VERY BASICS LIKE FOOD, HOUSING, MEDICAL CARE, AND HEATING?: NOT HARD AT ALL

## 2022-12-19 ASSESSMENT — SOCIAL DETERMINANTS OF HEALTH (SDOH)
HOW OFTEN DO YOU HAVE A DRINK CONTAINING ALCOHOL: 2-4 TIMES A MONTH
HOW MANY DRINKS CONTAINING ALCOHOL DO YOU HAVE ON A TYPICAL DAY WHEN YOU ARE DRINKING: 3 OR 4
IN A TYPICAL WEEK, HOW MANY TIMES DO YOU TALK ON THE PHONE WITH FAMILY, FRIENDS, OR NEIGHBORS?: THREE TIMES A WEEK
WITHIN THE PAST 12 MONTHS, YOU WORRIED THAT YOUR FOOD WOULD RUN OUT BEFORE YOU GOT THE MONEY TO BUY MORE: NEVER TRUE
HOW OFTEN DO YOU ATTENT MEETINGS OF THE CLUB OR ORGANIZATION YOU BELONG TO?: MORE THAN 4 TIMES PER YEAR
HOW HARD IS IT FOR YOU TO PAY FOR THE VERY BASICS LIKE FOOD, HOUSING, MEDICAL CARE, AND HEATING?: NOT HARD AT ALL
DO YOU BELONG TO ANY CLUBS OR ORGANIZATIONS SUCH AS CHURCH GROUPS UNIONS, FRATERNAL OR ATHLETIC GROUPS, OR SCHOOL GROUPS?: YES
HOW OFTEN DO YOU HAVE SIX OR MORE DRINKS ON ONE OCCASION: LESS THAN MONTHLY
IN A TYPICAL WEEK, HOW MANY TIMES DO YOU TALK ON THE PHONE WITH FAMILY, FRIENDS, OR NEIGHBORS?: THREE TIMES A WEEK
HOW OFTEN DO YOU ATTENT MEETINGS OF THE CLUB OR ORGANIZATION YOU BELONG TO?: MORE THAN 4 TIMES PER YEAR
HOW OFTEN DO YOU GET TOGETHER WITH FRIENDS OR RELATIVES?: ONCE A WEEK
DO YOU BELONG TO ANY CLUBS OR ORGANIZATIONS SUCH AS CHURCH GROUPS UNIONS, FRATERNAL OR ATHLETIC GROUPS, OR SCHOOL GROUPS?: YES
HOW OFTEN DO YOU ATTEND CHURCH OR RELIGIOUS SERVICES?: NEVER
HOW OFTEN DO YOU ATTEND CHURCH OR RELIGIOUS SERVICES?: NEVER
HOW OFTEN DO YOU GET TOGETHER WITH FRIENDS OR RELATIVES?: ONCE A WEEK

## 2022-12-19 ASSESSMENT — LIFESTYLE VARIABLES
AUDIT-C TOTAL SCORE: 4
HOW OFTEN DO YOU HAVE SIX OR MORE DRINKS ON ONE OCCASION: LESS THAN MONTHLY
HOW MANY STANDARD DRINKS CONTAINING ALCOHOL DO YOU HAVE ON A TYPICAL DAY: 3 OR 4
HOW OFTEN DO YOU HAVE A DRINK CONTAINING ALCOHOL: 2-4 TIMES A MONTH
SKIP TO QUESTIONS 9-10: 0

## 2022-12-22 ENCOUNTER — OFFICE VISIT (OUTPATIENT)
Dept: MEDICAL GROUP | Facility: MEDICAL CENTER | Age: 77
End: 2022-12-22
Payer: MEDICARE

## 2022-12-22 VITALS
DIASTOLIC BLOOD PRESSURE: 70 MMHG | TEMPERATURE: 97.9 F | HEIGHT: 63 IN | RESPIRATION RATE: 16 BRPM | WEIGHT: 139 LBS | HEART RATE: 65 BPM | SYSTOLIC BLOOD PRESSURE: 110 MMHG | OXYGEN SATURATION: 94 % | BODY MASS INDEX: 24.63 KG/M2

## 2022-12-22 DIAGNOSIS — J30.2 SEASONAL ALLERGIES: ICD-10-CM

## 2022-12-22 DIAGNOSIS — E53.8 VITAMIN B12 DEFICIENCY: ICD-10-CM

## 2022-12-22 DIAGNOSIS — Z23 NEED FOR PNEUMOCOCCAL VACCINATION: ICD-10-CM

## 2022-12-22 DIAGNOSIS — Z85.820 HISTORY OF MELANOMA: ICD-10-CM

## 2022-12-22 DIAGNOSIS — M85.80 OSTEOPENIA, UNSPECIFIED LOCATION: ICD-10-CM

## 2022-12-22 DIAGNOSIS — E55.9 VITAMIN D DEFICIENCY: ICD-10-CM

## 2022-12-22 DIAGNOSIS — Z79.890 POSTMENOPAUSAL HRT (HORMONE REPLACEMENT THERAPY): ICD-10-CM

## 2022-12-22 DIAGNOSIS — E78.5 HYPERLIPIDEMIA LDL GOAL <130: ICD-10-CM

## 2022-12-22 PROCEDURE — G0009 ADMIN PNEUMOCOCCAL VACCINE: HCPCS | Performed by: NURSE PRACTITIONER

## 2022-12-22 PROCEDURE — G0439 PPPS, SUBSEQ VISIT: HCPCS | Performed by: NURSE PRACTITIONER

## 2022-12-22 PROCEDURE — 90677 PCV20 VACCINE IM: CPT | Performed by: NURSE PRACTITIONER

## 2022-12-22 RX ORDER — ESTRADIOL 0.03 MG/D
FILM, EXTENDED RELEASE TRANSDERMAL
Qty: 24 PATCH | Refills: 3 | Status: SHIPPED | OUTPATIENT
Start: 2022-12-22 | End: 2023-12-02

## 2022-12-22 RX ORDER — EZETIMIBE 10 MG/1
10 TABLET ORAL
Qty: 90 TABLET | Refills: 3 | Status: SHIPPED | OUTPATIENT
Start: 2022-12-22

## 2022-12-22 RX ORDER — MONTELUKAST SODIUM 10 MG/1
10 TABLET ORAL DAILY
Qty: 90 TABLET | Refills: 3 | Status: SHIPPED | OUTPATIENT
Start: 2022-12-22

## 2022-12-22 ASSESSMENT — ENCOUNTER SYMPTOMS: GENERAL WELL-BEING: GOOD

## 2022-12-22 ASSESSMENT — PATIENT HEALTH QUESTIONNAIRE - PHQ9: CLINICAL INTERPRETATION OF PHQ2 SCORE: 0

## 2022-12-22 ASSESSMENT — ACTIVITIES OF DAILY LIVING (ADL): BATHING_REQUIRES_ASSISTANCE: 0

## 2022-12-22 NOTE — ASSESSMENT & PLAN NOTE
She is stable and well controlled on zetia.  Trying to decrease carbs in diet.   She has been trying to loose wt but hasn't been able to.

## 2022-12-22 NOTE — ASSESSMENT & PLAN NOTE
She has recently had more allergy sx.  No sx of infection.  + headache and sinus pressure.  She is taking zyrtec.   Just restarted singulair.

## 2022-12-22 NOTE — PROGRESS NOTES
Chief Complaint   Patient presents with    Annual Exam       HPI:  Stephania Galloway is a 77 y.o. here for Medicare Annual Wellness Visit.  She is feeling well.  She just got back from [place].  She is due prevnar 20  Reviewed lab with pt.  B12 is up from previous at 3492  LP shows ALL at goal.  She is stable on zetia.  Needs med refills, she is statin intolerant but taking zetia 1 tab/wk and 1/2 tab/wk.  Paul well.  Will have sx if increases her dose.    Vitamin B12 deficiency  Resolved.  b12 is high with otc supplement every 3 days.      Postmenopausal HRT (hormone replacement therapy)  Stable on meds.  Due for refill.  Taking med approp    Seasonal allergies  She has recently had more allergy sx.  No sx of infection.  + headache and sinus pressure.  She is taking zyrtec.   Just restarted singulair.      Osteopenia  She is on ca++ and d3.  She sometimes does weight bearing exercises    History of melanoma  Just saw derm.  No current concerns or lesions    Vitamin D deficiency disease  She is stable on otc ca++ and vitamin d.  No recent fx    Hyperlipidemia LDL goal <130  She is stable and well controlled on zetia.  Trying to decrease carbs in diet.   She has been trying to loose wt but hasn't been able to.          Patient Active Problem List    Diagnosis Date Noted    Age-related cataract of both eyes 12/16/2021    Glaucoma (increased eye pressure)     Hyperlipidemia LDL goal <130 11/03/2016    Vitamin D deficiency disease     Osteopenia 10/07/2012    History of melanoma 10/07/2012    Vitamin B12 deficiency 10/04/2012    Postmenopausal HRT (hormone replacement therapy) 10/04/2012    Seasonal allergies 10/04/2012       Current Outpatient Medications   Medication Sig Dispense Refill    Estradiol 0.025 MG/24HR PATCH BIWEEKLY APPLY TO AFFECTED AREA 1 PATCH TO SKIN AS DIRECTED EVERY 72 HOURS. 24 Patch 3    ezetimibe (ZETIA) 10 MG Tab Take 1 Tablet by mouth every day. 90 Tablet 3    montelukast (SINGULAIR) 10 MG Tab Take 1  Tablet by mouth every day. 90 Tablet 3    Non Formulary Request Apply 1 Application topically every day. DHEA/prog/test 10/50/3mg/gm apply 1 ml topically daily except for sundays. 3 Each 3    Calcium Carb-Cholecalciferol (CALCIUM 1000 + D PO) Take  by mouth every day.      latanoprost (XALATAN) 0.005 % Solution       loratadine (CLARITIN) 10 MG TABS Take 10 mg by mouth every day.       No current facility-administered medications for this visit.          Current supplements as per medication list.     Allergies: Amoxicillin-pot clavulanate, Augmentin, and Statins [hmg-coa-r inhibitors]    Current social contact/activities:    Travel   Reading  Painting  Cleaning house  Gardening  golf     She  reports that she has never smoked. She has never used smokeless tobacco. She reports current alcohol use. She reports that she does not use drugs.  Counseling given: Not Answered  Tobacco comments: only smokes socially      ROS:    Gait: Uses no assistive device  Ostomy: No  Other tubes: No  Amputations: No  Chronic oxygen use: No  Last eye exam: August 11, 2021  Wears hearing aids: No   : Reports urinary leakage during the last 6 months that has not interfered at all with their daily activities or sleep.  Review of Systems   Constitutional: Negative.  Negative for fever, chills, weight loss, malaise/fatigue and diaphoresis.   HENT: Negative.  Negative for hearing loss, ear pain, nosebleeds, congestion, sore throat, neck pain, tinnitus and ear discharge.    Eyes: Negative.  Negative for blurred vision, double vision, photophobia, pain, discharge and redness.   Respiratory: Negative.  Negative for cough, hemoptysis, sputum production, shortness of breath, wheezing and stridor.    Cardiovascular: Negative.  Negative for chest pain, palpitations, orthopnea, claudication, leg swelling and PND.   Gastrointestinal: Negative.  Negative for heartburn, nausea, vomiting, abdominal pain, diarrhea, constipation, blood in stool and  melena.   Genitourinary: Negative.  Negative for dysuria, urgency, frequency, incontinence, hematuria and flank pain.   Musculoskeletal: Negative.  Negative for myalgias, back pain, joint pain and falls.   Skin: Negative.  Negative for itching and rash. Followed by derm  Neurological: Negative.  Negative for dizziness, tingling, tremors, sensory change, speech change, focal weakness, seizures, loss of consciousness, weakness and headaches.   Endo/Heme/Allergies: Negative.  Negative for environmental allergies and polydipsia. Does not bruise/bleed easily.   Psychiatric/Behavioral: Negative.  Negative for depression, suicidal ideas, hallucinations, memory loss and substance abuse. The patient is not nervous/anxious and does not have insomnia.    All other systems reviewed and are negative.        Screening:  Prevnar 20  Depression Screening  Little interest or pleasure in doing things?  0 - not at all  Feeling down, depressed , or hopeless? 0 - not at all  Patient Health Questionnaire Score: 0     If depressive symptoms identified deferred to follow up visit unless specifically addressed in assessment and plan.    Interpretation of PHQ-9 Total Score   Score Severity   1-4 No Depression   5-9 Mild Depression   10-14 Moderate Depression   15-19 Moderately Severe Depression   20-27 Severe Depression    Screening for Cognitive Impairment  Three Minute Recall (daughter, heaven, mountain) 3/3    Curtis clock face with all 12 numbers and set the hands to show 10 past 11.  Yes    Cognitive concerns identified deferred for follow up unless specifically addressed in assessment and plan.    Fall Risk Assessment  Has the patient had two or more falls in the last year or any fall with injury in the last year?  No    Safety Assessment  Throw rugs on floor.  No  Handrails on all stairs.  No  Good lighting in all hallways.  Yes  Difficulty hearing.  No  Patient counseled about all safety risks that were identified.    Functional  Assessment ADLs  Are there any barriers preventing you from cooking for yourself or meeting nutritional needs?  No.    Are there any barriers preventing you from driving safely or obtaining transportation?  No.    Are there any barriers preventing you from using a telephone or calling for help?  No.    Are there any barriers preventing you from shopping?  No.    Are there any barriers preventing you from taking care of your own finances?  No.    Are there any barriers preventing you from managing your medications?  No.    Are there any barriers preventing you from showering, bathing or dressing yourself?  No.    Are you currently engaging in any exercise or physical activity?  No.     What is your perception of your health?  Good    Advance Care Planning  Do you have an Advance Directive, Living Will, Durable Power of , or POLST? Yes      Durable Power of    is on file      Health Maintenance Summary            Overdue - HEPATITIS C SCREENING (Once) Overdue - never done      No completion history exists for this topic.              BONE DENSITY (Every 2 Years) Next due on 12/22/2023 12/22/2021  DS-BONE DENSITY STUDY (DEXA)    11/04/2009  Done - L +1.2, H +1.0              IMM DTaP/Tdap/Td Vaccine (2 - Td or Tdap) Next due on 11/8/2027 11/08/2017  Imm Admin: Tdap Vaccine              COLORECTAL CANCER SCREENING (COLONOSCOPY - Every 10 Years) Next due on 2/6/2030 02/06/2020  REFERRAL TO GI FOR COLONOSCOPY    10/11/2012  OCCULT BLOOD FECES IMMUNOASSAY              IMM ZOSTER VACCINES (Series Information) Completed      12/02/2020  Imm Admin: Zoster Vaccine Recombinant (RZV) (SHINGRIX)    08/04/2020  Imm Admin: Zoster Vaccine Recombinant (RZV) (SHINGRIX)    02/01/2007  Imm Admin: Zoster Vaccine Live (ZVL) (Zostavax) - HISTORICAL DATA              COVID-19 Vaccine (Series Information) Completed      09/26/2022  Imm Admin: PFIZER BIVALENT BOOSTER SARS-COV-2 VACCINE (12+)    04/08/2022  Imm  Admin: PFIZER PURPLE CAP SARS-COV-2 VACCINATION (12+)    09/20/2021  Imm Admin: PFIZER PURPLE CAP SARS-COV-2 VACCINATION (12+)    09/20/2021  Imm Admin: PFIZER PURPLE CAP SARS-COV-2 VACCINATION (12+)    02/13/2021  Imm Admin: PFIZER PURPLE CAP SARS-COV-2 VACCINATION (12+)    Only the first 5 history entries have been loaded, but more history exists.              IMM INFLUENZA (Series Information) Completed      10/07/2022  Imm Admin: Influenza Vaccine Adult HD    10/07/2022  Imm Admin: Influenza Vaccine Adult HD    09/11/2021  Imm Admin: Influenza Vaccine Adult HD    08/17/2020  Imm Admin: Influenza Vaccine Adult HD    09/24/2019  Imm Admin: Influenza Vaccine Adult HD    Only the first 5 history entries have been loaded, but more history exists.              IMM PNEUMOCOCCAL VACCINE: 65+ Years (Series Information) Completed      12/22/2022  Imm Admin: Pneumococcal Conjugate Vaccine (PCV20)    01/27/2016  Imm Admin: Pneumococcal Conjugate Vaccine (Prevnar/PCV-13)    12/16/2015  Imm Admin: Pneumococcal Conjugate Vaccine (Prevnar/PCV-13)    10/16/2012  Imm Admin: Pneumococcal polysaccharide vaccine (PPSV-23)              IMM HEP B VACCINE (Series Information) Aged Out      No completion history exists for this topic.              IMM MENINGOCOCCAL ACWY VACCINE (Series Information) Aged Out      No completion history exists for this topic.              Discontinued - MAMMOGRAM  Scheduled for 12/27/2022        Frequency changed to Never automatically (Topic No Longer Applies)    12/22/2021  MA-SCREENING MAMMO BILAT W/TOMOSYNTHESIS W/CAD    12/01/2020  MA-SCREENING MAMMO BILAT W/TOMOSYNTHESIS W/CAD    11/19/2019  MA-SCREENING MAMMO BILAT W/TOMOSYNTHESIS W/CAD    11/12/2018  MA-SCREENING MAMMO BILAT W/TOMOSYNTHESIS W/CAD    Only the first 5 history entries have been loaded, but more history exists.              Discontinued - CERVICAL CANCER SCREENING  Discontinued        Frequency changed to Never automatically (Topic No  "Longer Applies)    10/16/2012  PAP LB, HPV-HR    09/20/2011  Done - NIL                    Patient Care Team:  DANIEL Ogden as PCP - General (Family Medicine)  Vitaly Alvarado O.D. as Consulting Physician (Optometry)  DANIEL Ashley (Dermatology)        Social History     Tobacco Use    Smoking status: Never    Smokeless tobacco: Never    Tobacco comments:     only smokes socially   Vaping Use    Vaping Use: Never used   Substance Use Topics    Alcohol use: Yes     Alcohol/week: 0.0 oz     Comment: very rarely, social drinking    Drug use: No     Family History   Problem Relation Age of Onset    Cancer Father         colon cancer dx at age ?    Cancer Paternal Grandfather 80        colon cancer     Heart Disease Mother     Stroke Mother     Hypertension Mother     Cancer Maternal Grandmother         breast    Lupus Sister      She  has a past medical history of Age-related cataract of both eyes (12/16/2021), Allergic rhinitis due to allergen, B12 deficiency, Glaucoma (increased eye pressure), History of melanoma, Hormone replacement therapy (postmenopausal), Hyperlipemia, Osteopenia, and Vitamin D deficiency disease.   Past Surgical History:   Procedure Laterality Date    PB EXCIS TENDON SHEATH LESION, HAND/FINGER Right 09/24/2021    Procedure: RIGHT RING FINGER CYST EXCISION, REPAIRS AS INDICATED;  Surgeon: Silva Roberts M.D.;  Location: [facility];  Service: Orthopedics    CATARACT EXTRACTION WITH IOL Bilateral        Exam:   /70   Pulse 65   Temp 36.6 °C (97.9 °F) (Temporal)   Resp 16   Ht 1.6 m (5' 3\")   Wt 63 kg (139 lb)   SpO2 94%  Body mass index is 24.62 kg/m².    Hearing excellent.    Dentition good  Alert, oriented in no acute distress.  Eye contact is good, speech goal directed, affect calm  Physical Exam   Vitals reviewed.  Constitutional: oriented to person, place, and time. appears well-developed and well-nourished. No distress.   HENT: Head: " Normocephalic and atraumatic. Bilateral tympanic membranes wnl w/o bulging.  Right Ear: External ear normal. Left Ear: External ear normal. Nose: Nose normal.  Mouth/Throat: Oropharynx is clear and moist. No oropharyngeal exudate. radha tm wnl. Eyes: Conjunctivae and EOM are normal. Pupils are equal, round, and reactive to light. Right eye exhibits no discharge. Left eye exhibits no discharge. No scleral icterus.    Neck: Normal range of motion. Neck supple. No JVD present.   Cardiovascular: Normal rate, regular rhythm, normal heart sounds and intact distal pulses.  Exam reveals no gallop and no friction rub.  No murmur heard.  No carotid bruits   Pulmonary/Chest: Effort normal and breath sounds normal. No stridor. No respiratory distress. no wheezes or rales. exhibits no tenderness.   Abdominal: Soft. Bowel sounds are normal. exhibits no distension and no mass. No tenderness. no rebound and no guarding.   Musculoskeletal: Normal range of motion. exhibits no edema or tenderness.  radha pedal pulses 2+.  Lymphadenopathy:  no cervical or supraclavicular adenopathy.   Neurological: alert and oriented to person, place, and time. has normal reflexes. displays normal reflexes. No cranial nerve deficit. exhibits normal muscle tone. Coordination normal.   Skin: Skin is warm and dry. No rash noted. no diaphoresis. No erythema. No pallor.   Psychiatric: normal mood and affect. behavior is normal.       Assessment and Plan. The following treatment and monitoring plan is recommended:    1. Hyperlipidemia LDL goal <130  ezetimibe (ZETIA) 10 MG Tab    controlled with zetia, diet & exercise.  intolerant of statins.  f/u 6 mo.  call for lab slip       2. Vitamin B12 deficiency      b12 is more elevated despite decrease in otc supplement.  dc b12 otc supplement      3. Osteopenia, unspecified location      enc pt to take ca++, vitamin d3 and weight bearing exercise.        4. Postmenopausal HRT (hormone replacement therapy)  Estradiol  0.025 MG/24HR PATCH BIWEEKLY    Non Formulary Request    stable on meds.  refilled meds.      5. Seasonal allergies  montelukast (SINGULAIR) 10 MG Tab    continue zyrtec, add singulair.  f/u if sx do not continue to improve      6. History of melanoma      no current concerns.  followed by derm      7. Vitamin D deficiency disease      continue otc vitamin d supplement      8. Need for pneumococcal vaccination  Pneumococcal Conjugate Vaccine 20-Valent (19 yrs+)    prevnar 20 given today            Services suggested: No services needed at this time  Health Care Screening: Age-appropriate preventive services recommended by USPTF and ACIP covered by Medicare were discussed today. Services ordered if indicated and agreed upon by the patient.  Referrals offered: Community-based lifestyle interventions to reduce health risks and promote self-management and wellness, fall prevention, nutrition, physical activity, tobacco-use cessation, weight loss, and mental health services as per orders if indicated.    Discussion today about general wellness and lifestyle habits:    Prevent falls and reduce trip hazards; Cautioned about securing or removing rugs.  Have a working fire alarm and carbon monoxide detector;   Engage in regular physical activity and social activities     Follow-up: Return in about 6 months (around 6/22/2023), or call for lab slip.

## 2022-12-27 ENCOUNTER — HOSPITAL ENCOUNTER (OUTPATIENT)
Dept: RADIOLOGY | Facility: MEDICAL CENTER | Age: 77
End: 2022-12-27
Attending: NURSE PRACTITIONER
Payer: MEDICARE

## 2022-12-27 DIAGNOSIS — Z12.31 VISIT FOR SCREENING MAMMOGRAM: ICD-10-CM

## 2022-12-27 PROCEDURE — 77063 BREAST TOMOSYNTHESIS BI: CPT

## 2023-03-27 ENCOUNTER — OFFICE VISIT (OUTPATIENT)
Dept: MEDICAL GROUP | Facility: MEDICAL CENTER | Age: 78
End: 2023-03-27
Payer: MEDICARE

## 2023-03-27 VITALS
HEIGHT: 64 IN | OXYGEN SATURATION: 98 % | HEART RATE: 63 BPM | RESPIRATION RATE: 16 BRPM | SYSTOLIC BLOOD PRESSURE: 130 MMHG | DIASTOLIC BLOOD PRESSURE: 86 MMHG | WEIGHT: 140 LBS | TEMPERATURE: 98.2 F | BODY MASS INDEX: 23.9 KG/M2

## 2023-03-27 DIAGNOSIS — J01.00 ACUTE NON-RECURRENT MAXILLARY SINUSITIS: ICD-10-CM

## 2023-03-27 PROCEDURE — 99214 OFFICE O/P EST MOD 30 MIN: CPT | Performed by: PHYSICIAN ASSISTANT

## 2023-03-27 RX ORDER — DOXYCYCLINE HYCLATE 100 MG
100 TABLET ORAL 2 TIMES DAILY
Qty: 20 TABLET | Refills: 0 | Status: SHIPPED | OUTPATIENT
Start: 2023-03-27 | End: 2023-04-06

## 2023-03-27 ASSESSMENT — PATIENT HEALTH QUESTIONNAIRE - PHQ9: CLINICAL INTERPRETATION OF PHQ2 SCORE: 0

## 2023-03-27 NOTE — PROGRESS NOTES
"Subjective:   Stephania Galloway is a 77 y.o. female here today for     HPI:Patient is here to discuss:    2-week history of postnasal drip, sinus congestion and bad taste in her mouth.  Denies shortness of breath, chest pain, chest tightness or cough.  Feels she has a sinus infection    ROS   No headaches, chest pain, no shortness of breath, abdominal pain, nausea, or vomiting.  All other systems were reviewed and are negative or noted as positive in the HPI.     Objective:     /86 (Patient Position: Sitting)   Pulse 63   Temp 36.8 °C (98.2 °F) (Temporal)   Resp 16   Ht 1.626 m (5' 4\")   Wt 63.5 kg (140 lb)   SpO2 98%  Body mass index is 24.03 kg/m².     Physical Exam:  General: Patient appears well-nourished, well-hydrated, nontoxic  HEENT, normocephalic atraumatic, PERRLA, extraocular movements intact, nares are patent and clear  Neck: No visible masses, thyromegaly or abnormalities noted  Cardiovascular.  Sitting comfortably without visible signs of edema  Lungs: No cyanosis noted, nondyspneic  Skin: Well perfused without evidence of rash or lesions  Neurological: Cranial nerves II through XII intact, normal gait  Musculoskeletal: Normal range of motion, normal strength and no deficit noted   HEENT.  Bilateral mild pain to percussion in the maxillary sinus region    Clinical Course/Lab Analysis:        Assessment and Plan:   The following treatment plan was discussed.  Signs and symptoms for which to return were discussed with patient at length.  Patient verbalized understanding.    1. Acute non-recurrent maxillary sinusitis  doxycycline (VIBRAMYCIN) 100 MG Tab          New and unstable.  Discussed likely viral etiology.  Supportive care measures encouraged.  Patient requesting antibiotic.  Please only fill if symptoms persist or worsen outside viral window which tends to last 10 to 14 days where she is developing fever that is not responsive to antipyretics.  Continue with supportive care measures such " as DayQuil and/or NyQuil.  No weakness in #      Followup: Within a week if symptoms worsen    Please note that this dictation was created using voice recognition software. I have made every reasonable attempt to correct obvious errors, but I expect that there are errors of grammar and possibly content that I did not discover before finalizing the note.

## 2023-06-05 ENCOUNTER — OFFICE VISIT (OUTPATIENT)
Dept: MEDICAL GROUP | Facility: MEDICAL CENTER | Age: 78
End: 2023-06-05
Payer: MEDICARE

## 2023-06-05 VITALS
WEIGHT: 136 LBS | TEMPERATURE: 96.7 F | DIASTOLIC BLOOD PRESSURE: 64 MMHG | OXYGEN SATURATION: 98 % | HEART RATE: 67 BPM | SYSTOLIC BLOOD PRESSURE: 116 MMHG | HEIGHT: 64 IN | BODY MASS INDEX: 23.22 KG/M2

## 2023-06-05 DIAGNOSIS — E55.9 VITAMIN D DEFICIENCY: ICD-10-CM

## 2023-06-05 DIAGNOSIS — R79.89 HIGH SERUM VITAMIN B12: ICD-10-CM

## 2023-06-05 DIAGNOSIS — E78.5 HYPERLIPIDEMIA LDL GOAL <130: ICD-10-CM

## 2023-06-05 DIAGNOSIS — E53.8 VITAMIN B12 DEFICIENCY: ICD-10-CM

## 2023-06-05 DIAGNOSIS — J01.01 ACUTE RECURRENT MAXILLARY SINUSITIS: ICD-10-CM

## 2023-06-05 PROCEDURE — 99214 OFFICE O/P EST MOD 30 MIN: CPT | Performed by: NURSE PRACTITIONER

## 2023-06-05 PROCEDURE — 3078F DIAST BP <80 MM HG: CPT | Performed by: NURSE PRACTITIONER

## 2023-06-05 PROCEDURE — 3074F SYST BP LT 130 MM HG: CPT | Performed by: NURSE PRACTITIONER

## 2023-06-05 RX ORDER — DOXYCYCLINE HYCLATE 100 MG
100 TABLET ORAL 2 TIMES DAILY
Qty: 20 TABLET | Refills: 0 | Status: SHIPPED | OUTPATIENT
Start: 2023-06-05 | End: 2023-08-17

## 2023-06-05 RX ORDER — METHYLPREDNISOLONE 4 MG/1
TABLET ORAL
Qty: 21 TABLET | Refills: 0 | Status: SHIPPED | OUTPATIENT
Start: 2023-06-05 | End: 2023-08-17

## 2023-06-05 NOTE — PROGRESS NOTES
Subjective:     Stephania Galloway is a 77 y.o. female who presents with sinusitis.    HPI:   Seen in f/u for sinusitis.    She was having severe max sinus pressure.  It was more severe and congestion was severe. Now its breaking up but still yellow or green secretions.  She has hx of sinus surgery.  She can't blow her nose.  Nothing will come out without bleeding.  When she does get secretions out it is bloody with clots.  Noknown fevers.  Headache last week.  + sore throat.  She got doxycycline in march for another sinus infection.  That fixed the infection.  She has also lost her sense of taste.     Patient Active Problem List    Diagnosis Date Noted    Age-related cataract of both eyes 12/16/2021    Glaucoma (increased eye pressure)     Hyperlipidemia LDL goal <130 11/03/2016    Vitamin D deficiency disease     Osteopenia 10/07/2012    History of melanoma 10/07/2012    Vitamin B12 deficiency 10/04/2012    Postmenopausal HRT (hormone replacement therapy) 10/04/2012    Seasonal allergies 10/04/2012       Current medicines (including changes today)  Current Outpatient Medications   Medication Sig Dispense Refill    doxycycline (VIBRAMYCIN) 100 MG Tab Take 1 Tablet by mouth 2 times a day. 20 Tablet 0    methylPREDNISolone (MEDROL DOSEPAK) 4 MG Tablet Therapy Pack As directed on the packaging label. 21 Tablet 0    Estradiol 0.025 MG/24HR PATCH BIWEEKLY APPLY TO AFFECTED AREA 1 PATCH TO SKIN AS DIRECTED EVERY 72 HOURS. 24 Patch 3    ezetimibe (ZETIA) 10 MG Tab Take 1 Tablet by mouth every day. 90 Tablet 3    montelukast (SINGULAIR) 10 MG Tab Take 1 Tablet by mouth every day. 90 Tablet 3    Non Formulary Request Apply 1 Application topically every day. DHEA/prog/test 10/50/3mg/gm apply 1 ml topically daily except for sundays. 3 Each 3    Calcium Carb-Cholecalciferol (CALCIUM 1000 + D PO) Take  by mouth every day.      latanoprost (XALATAN) 0.005 % Solution       loratadine (CLARITIN) 10 MG TABS Take 10 mg by mouth every  "day.       No current facility-administered medications for this visit.       Allergies   Allergen Reactions    Amoxicillin-Pot Clavulanate Hives    Augmentin     Statins [Hmg-Coa-R Inhibitors]      myalgias       ROS  Constitutional: Negative. Negative for fever, chills, weight loss, malaise/fatigue and diaphoresis.   HENT: Negative. Negative for hearing loss, ear pain, nosebleeds, neck pain, tinnitus and ear discharge.   Respiratory: Negative. Negative for cough, hemoptysis, sputum production, shortness of breath, wheezing and stridor.   Cardiovascular: Negative. Negative for chest pain, palpitations, orthopnea, claudication, leg swelling and PND.   Gastrointestinal: Denies nausea, vomiting, diarrhea, constipation, heartburn, melena or hematochezia.  Genitourinary: Denies dysuria, hematuria, urinary incontinence, frequency or urgency.        Objective:     /64 (BP Location: Left arm, Patient Position: Sitting, BP Cuff Size: Adult)   Pulse 67   Temp 35.9 °C (96.7 °F) (Temporal)   Ht 1.626 m (5' 4\")   Wt 61.7 kg (136 lb)   SpO2 98%  Body mass index is 23.34 kg/m².    Physical Exam:  Physical Exam   Vitals reviewed.  Constitutional: oriented to person, place, and time. appears well-developed and well-nourished. No distress.   HENT:  Head: Normocephalic and atraumatic. Right Ear: External ear normal. Left Ear: External ear normal. Nose: Nose normal. Mouth/Throat: Oropharynx is clear and moist. No oropharyngeal exudate.  left tm wnl.  Rt tm red and bulging.  Eyes: Right eye exhibits no discharge. Left eye exhibits no discharge. No scleral icterus.  Neck: No JVD present.  Cardiovascular: Normal rate, regular rhythm, normal heart sounds and intact distal pulses.  Exam reveals no gallop and no friction rub.  No murmur heard.  No carotid bruits.   Pulmonary/Chest: Effort normal and breath sounds normal. No stridor. No respiratory distress. no wheezes or rales. exhibits no tenderness.   Musculoskeletal: Normal " range of motion. exhibits no edema. radha pedal pulses 2+.  Lymphadenopathy: no cervical or supraclavicular adenopathy.   Abd:  No CVAT,  Soft,  Bs noted in all quadrants.  No HSM.  No abdominal tenderness.  Neurological: alert and oriented to person, place, and time. exhibits normal muscle tone. Coordination normal.   Skin: Skin is warm and dry. no diaphoresis.   Psychiatric: normal mood and affect. behavior is normal.     Assessment and Plan:     The following treatment plan was discussed:    1. Acute recurrent maxillary sinusitis  CT-LOCALIZATION LIMITED SINUSES    Referral to ENT    doxycycline (VIBRAMYCIN) 100 MG Tab    methylPREDNISolone (MEDROL DOSEPAK) 4 MG Tablet Therapy Pack    doxycycline and medrol for tx. CT sinuses and refer ENT for eval d/t recurrent sinus infections.  do lab and f/u for review 1 mo              Followup: Return in about 4 weeks (around 7/3/2023).

## 2023-08-11 ENCOUNTER — HOSPITAL ENCOUNTER (OUTPATIENT)
Dept: LAB | Facility: MEDICAL CENTER | Age: 78
End: 2023-08-11
Attending: NURSE PRACTITIONER
Payer: MEDICARE

## 2023-08-11 DIAGNOSIS — E55.9 VITAMIN D DEFICIENCY: ICD-10-CM

## 2023-08-11 DIAGNOSIS — E78.5 HYPERLIPIDEMIA LDL GOAL <130: ICD-10-CM

## 2023-08-11 DIAGNOSIS — R79.89 HIGH SERUM VITAMIN B12: ICD-10-CM

## 2023-08-11 LAB
25(OH)D3 SERPL-MCNC: 45 NG/ML (ref 30–100)
ALBUMIN SERPL BCP-MCNC: 4.1 G/DL (ref 3.2–4.9)
ALBUMIN/GLOB SERPL: 1.5 G/DL
ALP SERPL-CCNC: 62 U/L (ref 30–99)
ALT SERPL-CCNC: 12 U/L (ref 2–50)
ANION GAP SERPL CALC-SCNC: 9 MMOL/L (ref 7–16)
AST SERPL-CCNC: 12 U/L (ref 12–45)
BILIRUB SERPL-MCNC: 0.6 MG/DL (ref 0.1–1.5)
BUN SERPL-MCNC: 15 MG/DL (ref 8–22)
CALCIUM ALBUM COR SERPL-MCNC: 9.2 MG/DL (ref 8.5–10.5)
CALCIUM SERPL-MCNC: 9.3 MG/DL (ref 8.5–10.5)
CHLORIDE SERPL-SCNC: 103 MMOL/L (ref 96–112)
CHOLEST SERPL-MCNC: 216 MG/DL (ref 100–199)
CO2 SERPL-SCNC: 26 MMOL/L (ref 20–33)
CREAT SERPL-MCNC: 0.73 MG/DL (ref 0.5–1.4)
GFR SERPLBLD CREATININE-BSD FMLA CKD-EPI: 85 ML/MIN/1.73 M 2
GLOBULIN SER CALC-MCNC: 2.7 G/DL (ref 1.9–3.5)
GLUCOSE SERPL-MCNC: 78 MG/DL (ref 65–99)
HDLC SERPL-MCNC: 74 MG/DL
LDLC SERPL CALC-MCNC: 127 MG/DL
POTASSIUM SERPL-SCNC: 4.1 MMOL/L (ref 3.6–5.5)
PROT SERPL-MCNC: 6.8 G/DL (ref 6–8.2)
SODIUM SERPL-SCNC: 138 MMOL/L (ref 135–145)
TRIGL SERPL-MCNC: 74 MG/DL (ref 0–149)
VIT B12 SERPL-MCNC: 513 PG/ML (ref 211–911)

## 2023-08-11 PROCEDURE — 82607 VITAMIN B-12: CPT

## 2023-08-11 PROCEDURE — 80061 LIPID PANEL: CPT

## 2023-08-11 PROCEDURE — 36415 COLL VENOUS BLD VENIPUNCTURE: CPT

## 2023-08-11 PROCEDURE — 80053 COMPREHEN METABOLIC PANEL: CPT

## 2023-08-11 PROCEDURE — 82306 VITAMIN D 25 HYDROXY: CPT

## 2023-08-17 ENCOUNTER — OFFICE VISIT (OUTPATIENT)
Dept: MEDICAL GROUP | Facility: MEDICAL CENTER | Age: 78
End: 2023-08-17
Payer: MEDICARE

## 2023-08-17 VITALS
RESPIRATION RATE: 17 BRPM | BODY MASS INDEX: 22.88 KG/M2 | HEART RATE: 63 BPM | OXYGEN SATURATION: 97 % | WEIGHT: 134 LBS | TEMPERATURE: 98.4 F | DIASTOLIC BLOOD PRESSURE: 80 MMHG | SYSTOLIC BLOOD PRESSURE: 130 MMHG | HEIGHT: 64 IN

## 2023-08-17 DIAGNOSIS — R79.89 HIGH SERUM VITAMIN B12: ICD-10-CM

## 2023-08-17 DIAGNOSIS — E78.5 HYPERLIPIDEMIA LDL GOAL <130: ICD-10-CM

## 2023-08-17 DIAGNOSIS — E55.9 VITAMIN D DEFICIENCY: ICD-10-CM

## 2023-08-17 DIAGNOSIS — L98.9 SKIN LESION OF LEFT LEG: ICD-10-CM

## 2023-08-17 PROCEDURE — 3075F SYST BP GE 130 - 139MM HG: CPT | Performed by: NURSE PRACTITIONER

## 2023-08-17 PROCEDURE — 3079F DIAST BP 80-89 MM HG: CPT | Performed by: NURSE PRACTITIONER

## 2023-08-17 PROCEDURE — 99214 OFFICE O/P EST MOD 30 MIN: CPT | Performed by: NURSE PRACTITIONER

## 2023-08-17 RX ORDER — KETOCONAZOLE 20 MG/G
1 CREAM TOPICAL 2 TIMES DAILY
Qty: 30 G | Refills: 0 | Status: SHIPPED | OUTPATIENT
Start: 2023-08-17 | End: 2024-01-29

## 2023-08-17 NOTE — PROGRESS NOTES
Subjective:     Stephania Galloway is a 77 y.o. female who presents with hyperlipidemia goal <130.    HPI:   Seen in f/u for hyperlipidemia goal <130.  She is feeling well.  She has a scaling irregularly round lesion on left thigh.  No reddness or tender.  Has upcomign appt w/derm in december.  She is on healthy diet.  Eats lots of salads.  She exercises with going to gym regularly.  Reviewed lab with pt.  GFR, LP, B12, vitamin D, CMP is wnl.  Her previous B12 were very elevated.  She has stopped her otc supplement. N ow B12 is wnl.  She is stable on zetia for her LDL. Taking med approp.    She is stable and well controlled on otc vitamin D supplement.  Her last colonoscopy in 2020 showed 2 tubular adenomas.  She will be due updated colonoscoy in 2025.  Her father had colon cancer.  She also had some aunts and uncles with colon cancer.       Patient Active Problem List    Diagnosis Date Noted    Age-related cataract of both eyes 12/16/2021    Glaucoma (increased eye pressure)     Hyperlipidemia LDL goal <130 11/03/2016    Vitamin D deficiency disease     Osteopenia 10/07/2012    History of melanoma 10/07/2012    Vitamin B12 deficiency 10/04/2012    Postmenopausal HRT (hormone replacement therapy) 10/04/2012    Seasonal allergies 10/04/2012       Current medicines (including changes today)  Current Outpatient Medications   Medication Sig Dispense Refill    ketoconazole (NIZORAL) 2 % Cream Apply 1 Application topically 2 times a day. 30 g 0    Estradiol 0.025 MG/24HR PATCH BIWEEKLY APPLY TO AFFECTED AREA 1 PATCH TO SKIN AS DIRECTED EVERY 72 HOURS. 24 Patch 3    ezetimibe (ZETIA) 10 MG Tab Take 1 Tablet by mouth every day. 90 Tablet 3    montelukast (SINGULAIR) 10 MG Tab Take 1 Tablet by mouth every day. 90 Tablet 3    Non Formulary Request Apply 1 Application topically every day. DHEA/prog/test 10/50/3mg/gm apply 1 ml topically daily except for sundays. 3 Each 3    Calcium Carb-Cholecalciferol (CALCIUM 1000 + D PO)  "Take  by mouth every day.      latanoprost (XALATAN) 0.005 % Solution       loratadine (CLARITIN) 10 MG TABS Take 10 mg by mouth every day.       No current facility-administered medications for this visit.       Allergies   Allergen Reactions    Amoxicillin-Pot Clavulanate Hives    Augmentin     Statins [Hmg-Coa-R Inhibitors]      myalgias       ROS  Constitutional: Negative. Negative for fever, chills, weight loss, malaise/fatigue and diaphoresis.   HENT: Negative. Negative for hearing loss, ear pain, nosebleeds, congestion, sore throat, neck pain, tinnitus and ear discharge.   Respiratory: Negative. Negative for cough, hemoptysis, sputum production, shortness of breath, wheezing and stridor.   Cardiovascular: Negative. Negative for chest pain, palpitations, orthopnea, claudication, leg swelling and PND.   Gastrointestinal: Denies nausea, vomiting, diarrhea, constipation, heartburn, melena or hematochezia.  Genitourinary: Denies dysuria, hematuria, urinary incontinence, frequency or urgency.        Objective:     /80 (BP Location: Right arm, Patient Position: Sitting, BP Cuff Size: Adult)   Pulse 63   Temp 36.9 °C (98.4 °F) (Temporal)   Resp 17   Ht 1.613 m (5' 3.5\")   Wt 60.8 kg (134 lb)   SpO2 97%  Body mass index is 23.36 kg/m².    Physical Exam:  Vitals reviewed.  Constitutional: Oriented to person, place, and time. appears well-developed and well-nourished. No distress.   Cardiovascular: Normal rate, regular rhythm, normal heart sounds and intact distal pulses. Exam reveals no gallop and no friction rub. No murmur heard. No carotid bruits.   Pulmonary/Chest: Effort normal and breath sounds normal. No stridor. No respiratory distress. no wheezes or rales. exhibits no tenderness.   Musculoskeletal: Normal range of motion. exhibits no edema. radha pedal pulses 2+.  Lymphadenopathy: No cervical or supraclavicular adenopathy.   Neurological: Alert and oriented to person, place, and time. exhibits normal " muscle tone.  Skin: Skin is warm and dry. No diaphoresis.  Semicircular scaling lesion dime sized left thigh w/o erythema, edema or dg.  Psychiatric: Normal mood and affect. Behavior is normal.      Assessment and Plan:     The following treatment plan was discussed:    1. Hyperlipidemia LDL goal <130      stable and well controlled on zetia.  plan: f/u yearly. call for lab slip      2. Skin lesion of left leg  ketoconazole (NIZORAL) 2 % Cream    poss BCC but will tx for poss tinea also w/ketoconazole. she will f/u with derm as sched in november      3. High serum vitamin B12      B12 now wnl off supplement. xochilt lab 1 yr      4. Vitamin D deficiency      stable and well controlled on otc supplement            Followup: Return in about 1 year (around 8/17/2024), or call for lab slip.

## 2023-10-10 DIAGNOSIS — Z79.890 POSTMENOPAUSAL HRT (HORMONE REPLACEMENT THERAPY): ICD-10-CM

## 2023-10-20 DIAGNOSIS — Z12.31 ENCOUNTER FOR SCREENING MAMMOGRAM FOR MALIGNANT NEOPLASM OF BREAST: ICD-10-CM

## 2023-10-20 DIAGNOSIS — Z79.890 POSTMENOPAUSAL HRT (HORMONE REPLACEMENT THERAPY): ICD-10-CM

## 2023-10-20 DIAGNOSIS — N95.9 POST MENOPAUSAL PROBLEMS: ICD-10-CM

## 2023-10-23 ENCOUNTER — TELEPHONE (OUTPATIENT)
Dept: MEDICAL GROUP | Facility: MEDICAL CENTER | Age: 78
End: 2023-10-23
Payer: MEDICARE

## 2023-10-23 NOTE — TELEPHONE ENCOUNTER
CONTROLLED SUBSTANCE REFILL REQUEST FOR THIS PATIENT   Stephania Galloway  Female, 78 y.o., 1945  MRN:   [id number]  Phone:   [phone]  THE DOCUMENT IS IN  and provider folder [facility] [place].  PCP - General

## 2023-11-27 ENCOUNTER — APPOINTMENT (RX ONLY)
Dept: URBAN - METROPOLITAN AREA CLINIC 4 | Facility: CLINIC | Age: 78
Setting detail: DERMATOLOGY
End: 2023-11-27

## 2023-11-27 DIAGNOSIS — Z85.820 PERSONAL HISTORY OF MALIGNANT MELANOMA OF SKIN: ICD-10-CM

## 2023-11-27 DIAGNOSIS — D18.0 HEMANGIOMA: ICD-10-CM

## 2023-11-27 DIAGNOSIS — L82.1 OTHER SEBORRHEIC KERATOSIS: ICD-10-CM

## 2023-11-27 DIAGNOSIS — D22 MELANOCYTIC NEVI: ICD-10-CM

## 2023-11-27 DIAGNOSIS — L81.4 OTHER MELANIN HYPERPIGMENTATION: ICD-10-CM

## 2023-11-27 PROBLEM — D18.01 HEMANGIOMA OF SKIN AND SUBCUTANEOUS TISSUE: Status: ACTIVE | Noted: 2023-11-27

## 2023-11-27 PROBLEM — D22.5 MELANOCYTIC NEVI OF TRUNK: Status: ACTIVE | Noted: 2023-11-27

## 2023-11-27 PROCEDURE — ? ADDITIONAL NOTES

## 2023-11-27 PROCEDURE — 99213 OFFICE O/P EST LOW 20 MIN: CPT

## 2023-11-27 PROCEDURE — ? COUNSELING

## 2023-11-27 ASSESSMENT — LOCATION SIMPLE DESCRIPTION DERM
LOCATION SIMPLE: RIGHT AXILLARY VAULT
LOCATION SIMPLE: ABDOMEN
LOCATION SIMPLE: RIGHT BUTTOCK
LOCATION SIMPLE: RIGHT UPPER BACK

## 2023-11-27 ASSESSMENT — LOCATION DETAILED DESCRIPTION DERM
LOCATION DETAILED: LEFT RIB CAGE
LOCATION DETAILED: RIGHT BUTTOCK
LOCATION DETAILED: RIGHT SUPERIOR UPPER BACK
LOCATION DETAILED: LEFT LATERAL ABDOMEN
LOCATION DETAILED: RIGHT AXILLARY VAULT

## 2023-11-27 ASSESSMENT — LOCATION ZONE DERM
LOCATION ZONE: AXILLAE
LOCATION ZONE: TRUNK

## 2023-11-27 NOTE — PROCEDURE: ADDITIONAL NOTES
Render Risk Assessment In Note?: no
Additional Notes: This was diagnosed and treated surgically in them 1970s
Detail Level: Detailed

## 2023-12-02 DIAGNOSIS — Z79.890 POSTMENOPAUSAL HRT (HORMONE REPLACEMENT THERAPY): ICD-10-CM

## 2023-12-02 RX ORDER — ESTRADIOL 0.03 MG/D
FILM, EXTENDED RELEASE TRANSDERMAL
Qty: 24 PATCH | Refills: 3 | Status: SHIPPED | OUTPATIENT
Start: 2023-12-02

## 2023-12-28 ENCOUNTER — HOSPITAL ENCOUNTER (OUTPATIENT)
Dept: RADIOLOGY | Facility: MEDICAL CENTER | Age: 78
End: 2023-12-28
Attending: NURSE PRACTITIONER
Payer: MEDICARE

## 2023-12-28 DIAGNOSIS — N95.9 POST MENOPAUSAL PROBLEMS: ICD-10-CM

## 2023-12-28 DIAGNOSIS — Z12.31 ENCOUNTER FOR SCREENING MAMMOGRAM FOR MALIGNANT NEOPLASM OF BREAST: ICD-10-CM

## 2023-12-28 PROCEDURE — 77063 BREAST TOMOSYNTHESIS BI: CPT

## 2023-12-28 PROCEDURE — 77080 DXA BONE DENSITY AXIAL: CPT

## 2024-01-29 DIAGNOSIS — R92.2 DENSE BREASTS: ICD-10-CM

## 2024-01-29 DIAGNOSIS — R92.30 DENSE BREASTS: ICD-10-CM

## 2024-04-11 ENCOUNTER — HOSPITAL ENCOUNTER (OUTPATIENT)
Dept: RADIOLOGY | Facility: MEDICAL CENTER | Age: 79
End: 2024-04-11
Attending: NURSE PRACTITIONER
Payer: MEDICARE

## 2024-04-11 DIAGNOSIS — R92.30 DENSE BREASTS: ICD-10-CM

## 2024-04-11 PROCEDURE — 76641 ULTRASOUND BREAST COMPLETE: CPT

## 2024-05-23 DIAGNOSIS — E78.5 HYPERLIPIDEMIA LDL GOAL <130: ICD-10-CM

## 2024-05-26 DIAGNOSIS — E55.9 VITAMIN D DEFICIENCY: ICD-10-CM

## 2024-05-26 DIAGNOSIS — E78.5 HYPERLIPIDEMIA LDL GOAL <130: ICD-10-CM

## 2024-05-26 DIAGNOSIS — E53.8 VITAMIN B12 DEFICIENCY: ICD-10-CM

## 2024-05-26 RX ORDER — EZETIMIBE 10 MG/1
10 TABLET ORAL
Qty: 90 TABLET | Refills: 0 | Status: SHIPPED | OUTPATIENT
Start: 2024-05-26

## 2024-06-19 ENCOUNTER — HOSPITAL ENCOUNTER (OUTPATIENT)
Dept: LAB | Facility: MEDICAL CENTER | Age: 79
End: 2024-06-19
Attending: NURSE PRACTITIONER
Payer: MEDICARE

## 2024-06-19 DIAGNOSIS — E78.5 HYPERLIPIDEMIA LDL GOAL <130: ICD-10-CM

## 2024-06-19 DIAGNOSIS — E53.8 VITAMIN B12 DEFICIENCY: ICD-10-CM

## 2024-06-19 DIAGNOSIS — E55.9 VITAMIN D DEFICIENCY: ICD-10-CM

## 2024-06-19 LAB
25(OH)D3 SERPL-MCNC: 48 NG/ML (ref 30–100)
ALBUMIN SERPL BCP-MCNC: 4 G/DL (ref 3.2–4.9)
ALBUMIN/GLOB SERPL: 1.5 G/DL
ALP SERPL-CCNC: 60 U/L (ref 30–99)
ALT SERPL-CCNC: 13 U/L (ref 2–50)
ANION GAP SERPL CALC-SCNC: 10 MMOL/L (ref 7–16)
AST SERPL-CCNC: 17 U/L (ref 12–45)
BILIRUB SERPL-MCNC: 0.6 MG/DL (ref 0.1–1.5)
BUN SERPL-MCNC: 16 MG/DL (ref 8–22)
CALCIUM ALBUM COR SERPL-MCNC: 9.1 MG/DL (ref 8.5–10.5)
CALCIUM SERPL-MCNC: 9.1 MG/DL (ref 8.5–10.5)
CHLORIDE SERPL-SCNC: 104 MMOL/L (ref 96–112)
CHOLEST SERPL-MCNC: 213 MG/DL (ref 100–199)
CO2 SERPL-SCNC: 25 MMOL/L (ref 20–33)
CREAT SERPL-MCNC: 0.72 MG/DL (ref 0.5–1.4)
FASTING STATUS PATIENT QL REPORTED: NORMAL
GFR SERPLBLD CREATININE-BSD FMLA CKD-EPI: 86 ML/MIN/1.73 M 2
GLOBULIN SER CALC-MCNC: 2.7 G/DL (ref 1.9–3.5)
GLUCOSE SERPL-MCNC: 82 MG/DL (ref 65–99)
HDLC SERPL-MCNC: 81 MG/DL
LDLC SERPL CALC-MCNC: 119 MG/DL
POTASSIUM SERPL-SCNC: 4.6 MMOL/L (ref 3.6–5.5)
PROT SERPL-MCNC: 6.7 G/DL (ref 6–8.2)
SODIUM SERPL-SCNC: 139 MMOL/L (ref 135–145)
TRIGL SERPL-MCNC: 63 MG/DL (ref 0–149)
VIT B12 SERPL-MCNC: 376 PG/ML (ref 211–911)

## 2024-06-19 PROCEDURE — 82607 VITAMIN B-12: CPT

## 2024-06-19 PROCEDURE — 80053 COMPREHEN METABOLIC PANEL: CPT

## 2024-06-19 PROCEDURE — 80061 LIPID PANEL: CPT

## 2024-06-19 PROCEDURE — 36415 COLL VENOUS BLD VENIPUNCTURE: CPT

## 2024-06-19 PROCEDURE — 82306 VITAMIN D 25 HYDROXY: CPT

## 2024-07-23 ENCOUNTER — APPOINTMENT (OUTPATIENT)
Dept: MEDICAL GROUP | Facility: MEDICAL CENTER | Age: 79
End: 2024-07-23
Payer: MEDICARE

## 2024-07-23 VITALS
TEMPERATURE: 98.4 F | DIASTOLIC BLOOD PRESSURE: 70 MMHG | HEART RATE: 62 BPM | WEIGHT: 136.7 LBS | SYSTOLIC BLOOD PRESSURE: 112 MMHG | BODY MASS INDEX: 23.34 KG/M2 | HEIGHT: 64 IN | OXYGEN SATURATION: 100 %

## 2024-07-23 DIAGNOSIS — H40.9 GLAUCOMA OF BOTH EYES, UNSPECIFIED GLAUCOMA TYPE: ICD-10-CM

## 2024-07-23 DIAGNOSIS — E78.5 HYPERLIPIDEMIA LDL GOAL <130: ICD-10-CM

## 2024-07-23 DIAGNOSIS — R04.0 NOSEBLEED: ICD-10-CM

## 2024-07-23 DIAGNOSIS — E53.8 DISORDER OF VITAMIN B12: ICD-10-CM

## 2024-07-23 DIAGNOSIS — J30.89 CHRONIC NONSEASONAL ALLERGIC RHINITIS DUE TO POLLEN: ICD-10-CM

## 2024-07-23 DIAGNOSIS — L29.9 ITCHING: ICD-10-CM

## 2024-07-23 PROCEDURE — 3078F DIAST BP <80 MM HG: CPT | Performed by: NURSE PRACTITIONER

## 2024-07-23 PROCEDURE — 3074F SYST BP LT 130 MM HG: CPT | Performed by: NURSE PRACTITIONER

## 2024-07-23 PROCEDURE — 99214 OFFICE O/P EST MOD 30 MIN: CPT | Performed by: NURSE PRACTITIONER

## 2024-07-23 ASSESSMENT — PATIENT HEALTH QUESTIONNAIRE - PHQ9: CLINICAL INTERPRETATION OF PHQ2 SCORE: 0

## 2024-07-23 NOTE — PROGRESS NOTES
Subjective:     History of Present Illness  The patient is a 78-year-old female seen in follow-up.    The patient,previously diagnosed with glaucoma, was recently diagnosed with a retinal tear during a routine check-up. Despite this, her vision remains unaffected. She received laser surgery and shots in her left eye yesterday for repair.      The patient has a history of epistaxis, for which she was evaluated by an ENT specialist. Her medication was switched from Zyrtec to Allegra.  Currently, she reports no episodes of epistaxis.    The patient has a history of shingles, which she believes has recurred. She reports no associated rash, but experiences a stinging and itching sensation on both sides of her neck.  Just started recently.  She also thinks that it could be a reaction to her shampoo.  She will change shampoos and see if that helps.     The patient is currently on a regimen of Zetia 20 mg per week for cholesterol management.  She has been able to svitlana i zetia twice a week.  She is intolerant of all other statins.  She is on healthy diet and exercises regularly.    Lab reviewed with pt:  CMP - wnl  LP - trg and HDL at goal.  .  LDL goal <130.    Vitamin D3 - wnl, stable on otc supplement  GFR - wnl  B12 - down from 3493 one year ago, 513 eleven months ago.  Now down to 376.  She is not on any supplement currently.    Current medicines (including changes today)  Current Outpatient Medications   Medication Sig Dispense Refill    ezetimibe (ZETIA) 10 MG Tab TAKE 1 TABLET BY MOUTH EVERY DAY 90 Tablet 0    Estradiol 0.025 MG/24HR PATCH BIWEEKLY APPLY TO AFFECTED AREA 1 PATCH TO SKIN AS DIRECTED EVERY 72 HOURS. 24 Patch 3    Non Formulary Request Apply 1 Application  topically every day. DHEA/prog/test 10/50/3mg/gm apply 1 ml topically daily except for sundays. 3 Each 3    Calcium Carb-Cholecalciferol (CALCIUM 1000 + D PO) Take  by mouth every day.      latanoprost (XALATAN) 0.005 % Solution        No  current facility-administered medications for this visit.     She  has a past medical history of Age-related cataract of both eyes (12/16/2021), Allergic rhinitis due to allergen, B12 deficiency, Glaucoma (increased eye pressure), History of melanoma, Hormone replacement therapy (postmenopausal), Hyperlipemia, Osteopenia, and Vitamin D deficiency disease.    Lipid Panel:  Lab Results   Component Value Date/Time    CHOLSTRLTOT 213 (H) 06/19/2024 0838    TRIGLYCERIDE 63 06/19/2024 0838     (H) 06/19/2024 0838    CHOLHDLRAT 2.5 10/16/2014 0953     Complete Metabolic Panel:  Lab Results   Component Value Date/Time    SODIUM 139 06/19/2024 08:38 AM    POTASSIUM 4.6 06/19/2024 08:38 AM    CHLORIDE 104 06/19/2024 08:38 AM    CO2 25 06/19/2024 08:38 AM    ANION 10.0 06/19/2024 08:38 AM    GLUCOSE 82 06/19/2024 08:38 AM    BUN 16 06/19/2024 08:38 AM    CREATININE 0.72 06/19/2024 08:38 AM    CREATININE 0.65 10/30/2012 09:26 AM    ASTSGOT 17 06/19/2024 08:38 AM    ALTSGPT 13 06/19/2024 08:38 AM    TBILIRUBIN 0.6 06/19/2024 08:38 AM    ALBUMIN 4.0 06/19/2024 08:38 AM    TOTPROTEIN 6.7 06/19/2024 08:38 AM    GLOBULIN 2.7 06/19/2024 08:38 AM    AGRATIO 1.5 06/19/2024 08:38 AM         Vitamin D:    Lab Results   Component Value Date/Time    25HYDROXY 48 06/19/2024 0838       GFR:    Lab Results   Component Value Date/Time    GFRCKD 86 06/19/2024 0838           ROS   Review of Systems   Constitutional: Negative.  Negative for fever, chills, weight loss, malaise/fatigue and diaphoresis.   HENT: Negative.  Negative for hearing loss, ear pain,  congestion, sore throat, neck pain, tinnitus and ear discharge.    Respiratory: Negative.  Negative for cough, hemoptysis, sputum production, shortness of breath, wheezing and stridor.    Cardiovascular: Negative.  Negative for chest pain, palpitations, orthopnea, claudication, leg swelling and PND.   Gastrointestinal: denies nausea, vomiting, diarrhea, constipation, heartburn, melena or  "hematochezia.  Genitourinary: Denies dysuria, hematuria, urinary incontinence, frequency or urgency.    Musculoskeletal: Negative.  Negative for myalgias and back pain.   Neurological: Negative.  Negative for dizziness, tingling, tremors, weakness and headaches.   Psych:  Denies depression, anxiety or insomnia.  All other systems reviewed and are negative.         Objective:     /70 (BP Location: Left arm, Patient Position: Sitting, BP Cuff Size: Adult)   Pulse 62   Temp 36.9 °C (98.4 °F) (Temporal)   Ht 1.613 m (5' 3.5\")   Wt 62 kg (136 lb 11.2 oz)   SpO2 100%  Body mass index is 23.84 kg/m².   Physical Exam    Physical Exam   Vitals reviewed.  Constitutional: oriented to person, place, and time. appears well-developed and well-nourished. No distress.   Neck: No JVD present.  Cardiovascular: Normal rate, regular rhythm, normal heart sounds and intact distal pulses.  Exam reveals no gallop and no friction rub.  No murmur heard.  No carotid bruits.   Pulmonary/Chest: Effort normal and breath sounds normal. No stridor. No respiratory distress. no wheezes or rales. exhibits no tenderness.   Musculoskeletal: Normal range of motion. exhibits no edema. radha pedal pulses 2+.  Lymphadenopathy: no cervical or supraclavicular adenopathy.   Neurological: alert and oriented to person, place, and time. exhibits normal muscle tone. Coordination normal.   Skin: Skin is warm and dry. no diaphoresis.   Psychiatric: normal mood and affect. behavior is normal.           Assessment and Plan:   The following treatment plan was discussed      Assessment & Plan  1. Hyperlipidemia.  The patient's triglycerides and HDL levels are within the target range, with an LDL level of 119, a decrease from 127. The target LDL level is currently less than 130. The patient is advised to maintain a healthy diet and exercise regimen. A repeat lab, including LipoFit and a chemistry panel, is scheduled for 6 months from now. A follow-up " consultation is scheduled for review.    2. B12 deficiency or disorder of B12.  The patient has discontinued her B12 supplements since her B12 levels had risen to over 3000. Currently, her B12 levels have decreased from 513 to 376 without supplementation. The patient will resume B12 at 1000 mcg daily. B12 levels will be rechecked in 6 months.      ORDERS:    1. Hyperlipidemia LDL goal <130  LipoFit by NMR    Comp Metabolic Panel      2. Chronic nonseasonal allergic rhinitis due to pollen      followed by ENT.  currently stable      3. Nosebleed      followed by ENT. no current sx      4. Itching      f/u if sx continue. since sx on both sides of neck then not shingles      5. Disorder of vitamin B12  VITAMIN B12      6. Glaucoma of both eyes, unspecified glaucoma type      vision intact. continue f/u with ophth                Please note that this dictation was created using voice recognition software. I have made every reasonable attempt to correct obvious errors, but I expect that there are errors of grammar and possibly content that I did not discover before finalizing the note.      Attestation      Verbal consent was acquired by the patient to use [employer] ambient listening note generation during this visit Yes

## 2024-08-31 DIAGNOSIS — Z79.890 POSTMENOPAUSAL HRT (HORMONE REPLACEMENT THERAPY): ICD-10-CM

## 2024-09-01 DIAGNOSIS — Z79.890 POSTMENOPAUSAL HRT (HORMONE REPLACEMENT THERAPY): ICD-10-CM

## 2024-09-05 DIAGNOSIS — Z79.890 POSTMENOPAUSAL HRT (HORMONE REPLACEMENT THERAPY): ICD-10-CM

## 2024-09-09 ENCOUNTER — TELEPHONE (OUTPATIENT)
Dept: MEDICAL GROUP | Facility: MEDICAL CENTER | Age: 79
End: 2024-09-09
Payer: MEDICARE

## 2024-09-10 NOTE — TELEPHONE ENCOUNTER
[employer] left  requesting refill for cream Rx send 09/05/24   Faxed copy of order to pharmacy

## 2024-09-15 DIAGNOSIS — Z79.890 POSTMENOPAUSAL HRT (HORMONE REPLACEMENT THERAPY): ICD-10-CM

## 2024-09-19 DIAGNOSIS — Z79.890 POSTMENOPAUSAL HRT (HORMONE REPLACEMENT THERAPY): ICD-10-CM

## 2024-10-07 DIAGNOSIS — Z79.890 POSTMENOPAUSAL HRT (HORMONE REPLACEMENT THERAPY): ICD-10-CM

## 2024-10-07 RX ORDER — ESTRADIOL 0.03 MG/D
FILM, EXTENDED RELEASE TRANSDERMAL
Qty: 24 PATCH | Refills: 3 | Status: SHIPPED | OUTPATIENT
Start: 2024-10-07

## 2024-10-09 DIAGNOSIS — Z79.890 POSTMENOPAUSAL HRT (HORMONE REPLACEMENT THERAPY): ICD-10-CM

## 2024-11-16 DIAGNOSIS — Z79.890 POSTMENOPAUSAL HRT (HORMONE REPLACEMENT THERAPY): ICD-10-CM

## 2024-11-18 NOTE — TELEPHONE ENCOUNTER
Received request via: Pharmacy    Was the patient seen in the last year in this department? Yes    Does the patient have an active prescription (recently filled or refills available) for medication(s) requested? No    [facility]     Does the patient have [facility] Plus and need 100-day supply? (This applies to ALL medications) Patient does not have SCP

## 2024-11-20 DIAGNOSIS — Z79.890 POSTMENOPAUSAL HRT (HORMONE REPLACEMENT THERAPY): ICD-10-CM

## 2024-11-26 DIAGNOSIS — Z79.890 POSTMENOPAUSAL HRT (HORMONE REPLACEMENT THERAPY): ICD-10-CM

## 2024-11-27 NOTE — TELEPHONE ENCOUNTER
Received request via: Patient    Was the patient seen in the last year in this department? Yes    Does the patient have an active prescription (recently filled or refills available) for medication(s) requested? No    Pharmacy Name: [facility]    Does the patient have [facility] Plus and need 100-day supply? (This applies to ALL medications) Patient does not have SCP        Patient comment: This prescription is through [facility] at [address] [place] [address]. [facility] has sent me a few texts that they have not heard from you re refil

## 2024-12-02 ENCOUNTER — APPOINTMENT (OUTPATIENT)
Dept: URBAN - METROPOLITAN AREA CLINIC 4 | Facility: CLINIC | Age: 79
Setting detail: DERMATOLOGY
End: 2024-12-02

## 2024-12-02 DIAGNOSIS — L82.1 OTHER SEBORRHEIC KERATOSIS: ICD-10-CM

## 2024-12-02 DIAGNOSIS — L81.4 OTHER MELANIN HYPERPIGMENTATION: ICD-10-CM

## 2024-12-02 DIAGNOSIS — D22 MELANOCYTIC NEVI: ICD-10-CM

## 2024-12-02 DIAGNOSIS — Z85.820 PERSONAL HISTORY OF MALIGNANT MELANOMA OF SKIN: ICD-10-CM

## 2024-12-02 DIAGNOSIS — Z71.89 OTHER SPECIFIED COUNSELING: ICD-10-CM

## 2024-12-02 DIAGNOSIS — D18.0 HEMANGIOMA: ICD-10-CM

## 2024-12-02 PROBLEM — D22.5 MELANOCYTIC NEVI OF TRUNK: Status: ACTIVE | Noted: 2024-12-02

## 2024-12-02 PROBLEM — D18.01 HEMANGIOMA OF SKIN AND SUBCUTANEOUS TISSUE: Status: ACTIVE | Noted: 2024-12-02

## 2024-12-02 PROCEDURE — ? COUNSELING

## 2024-12-02 PROCEDURE — 99213 OFFICE O/P EST LOW 20 MIN: CPT | Mod: 25

## 2024-12-02 PROCEDURE — ? LIQUID NITROGEN

## 2024-12-02 PROCEDURE — 17110 DESTRUCTION B9 LES UP TO 14: CPT

## 2024-12-02 PROCEDURE — ? ADDITIONAL NOTES

## 2024-12-02 ASSESSMENT — LOCATION DETAILED DESCRIPTION DERM
LOCATION DETAILED: LEFT MEDIAL SUPERIOR CHEST
LOCATION DETAILED: UPPER STERNUM
LOCATION DETAILED: RIGHT MID-UPPER BACK
LOCATION DETAILED: RIGHT AXILLARY VAULT
LOCATION DETAILED: RIGHT SUPERIOR UPPER BACK
LOCATION DETAILED: RIGHT SUPERIOR MEDIAL UPPER BACK
LOCATION DETAILED: RIGHT ANTERIOR DISTAL THIGH
LOCATION DETAILED: RIGHT INFERIOR UPPER BACK

## 2024-12-02 ASSESSMENT — LOCATION ZONE DERM
LOCATION ZONE: TRUNK
LOCATION ZONE: AXILLAE
LOCATION ZONE: LEG

## 2024-12-02 ASSESSMENT — LOCATION SIMPLE DESCRIPTION DERM
LOCATION SIMPLE: RIGHT UPPER BACK
LOCATION SIMPLE: RIGHT THIGH
LOCATION SIMPLE: CHEST
LOCATION SIMPLE: RIGHT AXILLARY VAULT

## 2024-12-02 NOTE — PROCEDURE: MIPS QUALITY
Quality 137: Melanoma: Continuity Of Care - Recall System: Patient information entered into a recall system that includes: target date for the next exam specified AND a process to follow up with patients regarding missed or unscheduled appointments
Quality 111:Pneumonia Vaccination Status For Older Adults: Pneumococcal Vaccination Previously Received
Quality 130: Documentation Of Current Medications In The Medical Record: Current Medications Documented
Quality 226: Preventive Care And Screening: Tobacco Use: Screening And Cessation Intervention: Patient screened for tobacco use and is an ex/non-smoker
Detail Level: Detailed

## 2024-12-02 NOTE — PROCEDURE: LIQUID NITROGEN
Spray Paint Technique: No
Consent: The patient's consent was obtained including but not limited to risks of crusting, scabbing, blistering, scarring, darker or lighter pigmentary change, recurrence, incomplete removal and infection.
Detail Level: Detailed
Show Spray Paint Technique Variable?: Yes
Post-Care Instructions: I reviewed with the patient in detail post-care instructions. Patient is to wear sunprotection, and avoid picking at any of the treated lesions. Pt may apply Vaseline to crusted or scabbing areas.
Medical Necessity Information: It is in your best interest to select a reason for this procedure from the list below. All of these items fulfill various CMS LCD requirements except the new and changing color options.
Medical Necessity Clause: This procedure was medically necessary because the lesions that were treated were:
Spray Paint Text: The liquid nitrogen was applied to the skin utilizing a spray paint frosting technique.

## 2024-12-06 RX ORDER — ESTRADIOL 0.03 MG/D
FILM, EXTENDED RELEASE TRANSDERMAL
Qty: 24 PATCH | Refills: 0 | Status: SHIPPED
Start: 2024-12-06

## 2024-12-07 RX ORDER — ESTRADIOL 0.03 MG/D
FILM, EXTENDED RELEASE TRANSDERMAL
Qty: 24 PATCH | Refills: 3 | Status: SHIPPED | OUTPATIENT
Start: 2024-12-07

## 2025-01-02 ENCOUNTER — APPOINTMENT (OUTPATIENT)
Dept: LAB | Facility: MEDICAL CENTER | Age: 80
End: 2025-01-02
Payer: MEDICARE

## 2025-01-02 DIAGNOSIS — E53.8 DISORDER OF VITAMIN B12: ICD-10-CM

## 2025-01-02 DIAGNOSIS — E78.5 HYPERLIPIDEMIA LDL GOAL <130: ICD-10-CM

## 2025-01-02 LAB
ALBUMIN SERPL BCP-MCNC: 3.9 G/DL (ref 3.2–4.9)
ALBUMIN/GLOB SERPL: 1.5 G/DL
ALP SERPL-CCNC: 55 U/L (ref 30–99)
ALT SERPL-CCNC: 11 U/L (ref 2–50)
ANION GAP SERPL CALC-SCNC: 7 MMOL/L (ref 7–16)
AST SERPL-CCNC: 13 U/L (ref 12–45)
BILIRUB SERPL-MCNC: 0.6 MG/DL (ref 0.1–1.5)
BUN SERPL-MCNC: 18 MG/DL (ref 8–22)
CALCIUM ALBUM COR SERPL-MCNC: 9.3 MG/DL (ref 8.5–10.5)
CALCIUM SERPL-MCNC: 9.2 MG/DL (ref 8.5–10.5)
CHLORIDE SERPL-SCNC: 103 MMOL/L (ref 96–112)
CO2 SERPL-SCNC: 28 MMOL/L (ref 20–33)
CREAT SERPL-MCNC: 0.87 MG/DL (ref 0.5–1.4)
GFR SERPLBLD CREATININE-BSD FMLA CKD-EPI: 68 ML/MIN/1.73 M 2
GLOBULIN SER CALC-MCNC: 2.6 G/DL (ref 1.9–3.5)
GLUCOSE SERPL-MCNC: 88 MG/DL (ref 65–99)
POTASSIUM SERPL-SCNC: 4.1 MMOL/L (ref 3.6–5.5)
PROT SERPL-MCNC: 6.5 G/DL (ref 6–8.2)
SODIUM SERPL-SCNC: 138 MMOL/L (ref 135–145)
VIT B12 SERPL-MCNC: 1577 PG/ML (ref 211–911)

## 2025-01-02 PROCEDURE — 83704 LIPOPROTEIN BLD QUAN PART: CPT

## 2025-01-02 PROCEDURE — 82607 VITAMIN B-12: CPT

## 2025-01-02 PROCEDURE — 80053 COMPREHEN METABOLIC PANEL: CPT

## 2025-01-02 PROCEDURE — 80061 LIPID PANEL: CPT | Mod: XU

## 2025-01-05 LAB
CHOLEST SERPL-MCNC: 214 MG/DL
HDL PARTICAL NO Q4363: 36.7 UMOL/L
HDL SIZE Q4361: 10 NM
HDLC SERPL-MCNC: 80 MG/DL (ref 40–59)
HLD.LARGE SERPL-SCNC: 13.7 UMOL/L
L VLDL PART NO Q4357: 1.6 NMOL/L
LDL SERPL QN: 21.9 NM
LDL SERPL-SCNC: 998 NMOL/L
LDL SMALL SERPL-SCNC: <165 NMOL/L
LDLC SERPL CALC-MCNC: 115 MG/DL
PATHOLOGY STUDY: ABNORMAL
TRIGL SERPL-MCNC: 94 MG/DL (ref 30–149)
VLDL SIZE Q4362: 48.4 NM

## 2025-01-20 ENCOUNTER — APPOINTMENT (OUTPATIENT)
Dept: MEDICAL GROUP | Facility: MEDICAL CENTER | Age: 80
End: 2025-01-20
Payer: MEDICARE

## 2025-01-20 VITALS
HEIGHT: 64 IN | TEMPERATURE: 97 F | HEART RATE: 60 BPM | SYSTOLIC BLOOD PRESSURE: 142 MMHG | WEIGHT: 137 LBS | DIASTOLIC BLOOD PRESSURE: 70 MMHG | OXYGEN SATURATION: 96 % | BODY MASS INDEX: 23.39 KG/M2

## 2025-01-20 DIAGNOSIS — Z12.31 ENCOUNTER FOR SCREENING MAMMOGRAM FOR MALIGNANT NEOPLASM OF BREAST: ICD-10-CM

## 2025-01-20 DIAGNOSIS — R79.89 HIGH SERUM VITAMIN B12: ICD-10-CM

## 2025-01-20 DIAGNOSIS — Z12.11 ENCOUNTER FOR SCREENING FOR MALIGNANT NEOPLASM OF COLON: ICD-10-CM

## 2025-01-20 DIAGNOSIS — N18.2 STAGE 2 CHRONIC KIDNEY DISEASE: ICD-10-CM

## 2025-01-20 DIAGNOSIS — E78.5 HYPERLIPIDEMIA LDL GOAL <130: ICD-10-CM

## 2025-01-20 DIAGNOSIS — R03.0 ELEVATED BLOOD PRESSURE READING WITHOUT DIAGNOSIS OF HYPERTENSION: ICD-10-CM

## 2025-01-20 PROCEDURE — 99214 OFFICE O/P EST MOD 30 MIN: CPT | Performed by: NURSE PRACTITIONER

## 2025-01-20 PROCEDURE — 3077F SYST BP >= 140 MM HG: CPT | Performed by: NURSE PRACTITIONER

## 2025-01-20 PROCEDURE — 3078F DIAST BP <80 MM HG: CPT | Performed by: NURSE PRACTITIONER

## 2025-01-20 NOTE — PROGRESS NOTES
Subjective:     History of Present Illness  The patient is a 79-year-old female seen in follow-up for hyperlipidemia.    She has been monitoring her blood pressure at home, which typically registers around 120 systolic.  She reports no weight loss, fatigue, or general malaise.  Today in office her SBP is elevated at 140    She has expressed interest in undergoing a colonoscopy, as recommended by her health center. She has requested a referral to [facility]. She has seen [facility] in the past but does not want to see them again d/t poor experience.  She had been notified by [facility] that she is due but she doesn't want to go back to them.     She has discontinued her vitamin B12 supplementation after she saw her lab results and read that it could potentially lead to tumors and leukemia. She was previously taking 1000 mcg of vitamin B12 daily.    She is currently on a regimen of 3 pills of Zetia per week.  She is on healthy diet and exercises regulalry.  No s/e to zetia.  Taking med approp.    She has been advised to increase her fluid intake. She admits to not consuming a large amount of fluids.    FAMILY HISTORY  Her  has high blood pressure.    MEDICATIONS  Current: Estradiol patch, Zetia, bioidentical hormone replacement therapy (DHEA/progesterone/testosterone), vitamin B12 (discontinued).    IMMUNIZATIONS  She has received her influenza, shingles, and pneumococcal pneumonia vaccines.      Results  - Laboratory Studies:    - lipofit: normal except for VLDL size mildly abn     - LDL particles: 998    - Small dense LDL particles: <165    - Very small LDL particles: 1.6    - HDL: 36.7    - Large HDL particles: 13.7    - HDL Particle size: 21.9    - LDL particle size: 48.4    - Healthy cholesterol particle size: 10    - CMP: wnl    - B12: slightly elevated at 1577.  That is up from 376    - GFR - low normal at 68, down from 86    Current medicines (including changes today)  Current Outpatient  Medications   Medication Sig Dispense Refill    Estradiol 0.025 MG/24HR PATCH BIWEEKLY APPLY TO AFFECTED AREA 1 PATCH TO SKIN AS DIRECTED EVERY 72 HOURS. 24 Patch 0    Non Formulary Request Apply 1 Application  topically every day. DHEA/prog/test 10/50/3mg/gm apply 1 ml topically daily except for sundays.  Sent to [address] 3 Each 3    ezetimibe (ZETIA) 10 MG Tab TAKE 1 TABLET BY MOUTH EVERY DAY 90 Tablet 0    Calcium Carb-Cholecalciferol (CALCIUM 1000 + D PO) Take  by mouth every day.      latanoprost (XALATAN) 0.005 % Solution        No current facility-administered medications for this visit.     Current Outpatient Medications   Medication Sig Dispense Refill    Estradiol 0.025 MG/24HR PATCH BIWEEKLY APPLY TO AFFECTED AREA 1 PATCH TO SKIN AS DIRECTED EVERY 72 HOURS. 24 Patch 0    Non Formulary Request Apply 1 Application  topically every day. DHEA/prog/test 10/50/3mg/gm apply 1 ml topically daily except for sundays.  Sent to [address] 3 Each 3    ezetimibe (ZETIA) 10 MG Tab TAKE 1 TABLET BY MOUTH EVERY DAY 90 Tablet 0    Calcium Carb-Cholecalciferol (CALCIUM 1000 + D PO) Take  by mouth every day.      latanoprost (XALATAN) 0.005 % Solution        No current facility-administered medications for this visit.       She  has a past medical history of Age-related cataract of both eyes (12/16/2021), Allergic rhinitis due to allergen, B12 deficiency, Glaucoma (increased eye pressure), History of melanoma, Hormone replacement therapy (postmenopausal), Hyperlipemia, Osteopenia, and Vitamin D deficiency disease.      Lipid Panel:  Lab Results   Component Value Date/Time    CHOLSTRLTOT 214 (H) 01/02/2025 0902    TRIGLYCERIDE 94 01/02/2025 0902     (H) 06/19/2024 0838    CHOLHDLRAT 2.5 10/16/2014 0953     GFR:    Lab Results   Component Value Date/Time    GFRCKD 68 01/02/2025 0902         ROS   Review of Systems   Constitutional: Negative.  Negative for fever, chills, weight loss, malaise/fatigue and diaphoresis.   HENT:  "Negative.  Negative for hearing loss, ear pain, nosebleeds, congestion, sore throat, neck pain, tinnitus and ear discharge.    Respiratory: Negative.  Negative for cough, hemoptysis, sputum production, shortness of breath, wheezing and stridor.    Cardiovascular: Negative.  Negative for chest pain, palpitations, orthopnea, claudication, leg swelling and PND.   Gastrointestinal: denies nausea, vomiting, diarrhea, constipation, heartburn, melena or hematochezia.  Genitourinary: Denies dysuria, hematuria, urinary incontinence, frequency or urgency.    Musculoskeletal: Negative.  Negative for myalgias and back pain.   Neurological: Negative.  Negative for dizziness, tingling, tremors, weakness and headaches.   Psych:  Denies depression, anxiety or insomnia.  All other systems reviewed and are negative.       Objective:     BP (!) 142/70   Pulse 60   Temp 36.1 °C (97 °F) (Temporal)   Ht 1.613 m (5' 3.5\")   Wt 62.1 kg (137 lb)   SpO2 96%  Body mass index is 23.89 kg/m².   Physical Exam  Lungs were auscultated.    Vital Signs  The patient's blood pressure is 142 systolic. Heart rate, temperature, and breathing are normal.  Physical Exam   Vitals reviewed.  Constitutional: oriented to person, place, and time. appears well-developed and well-nourished. No distress.   Neck: No JVD present.  Cardiovascular: Normal rate, regular rhythm, normal heart sounds and intact distal pulses.  Exam reveals no gallop and no friction rub.  No murmur heard.  No carotid bruits.   Pulmonary/Chest: Effort normal and breath sounds normal. No stridor. No respiratory distress. no wheezes or rales. exhibits no tenderness.   Musculoskeletal: Normal range of motion. exhibits no edema. radha pedal pulses 2+.  Lymphadenopathy: no cervical or supraclavicular adenopathy.   Neurological: alert and oriented to person, place, and time. exhibits normal muscle tone. Coordination normal.   Skin: Skin is warm and dry. no diaphoresis.   Psychiatric: normal " mood and affect. behavior is normal.       Assessment and Plan:   The following treatment plan was discussed    Assessment & Plan  1. Elevated blood pressure without a diagnosis of hypertension.  Her blood pressure today is elevated.  She is advised to monitor her blood pressure at home and report back if the systolic reading consistently exceeds 140.    2. Chronic kidney disease, stage 2.  Her GFR has decreased from the 80s to 68, indicating a need for increased fluid intake. This could also be due to uncontrolled blood pressure. Her creatinine levels are within normal range. She is advised to increase her fluid intake and limit her salt consumption. A BMP and an albumin creatinine ratio will be ordered to ensure her kidneys are functioning properly.    3. Elevated B12 levels.  Her B12 levels are slightly elevated, but not excessively so. She is advised to reduce her B12 intake to 1 tablet 6 times a week, skipping 1 day. A B12 test will be ordered to monitor her levels.    4. Health maintenance.  She is due for her annual wellness visit and COVID-19 vaccination. A mammogram will be ordered for her, as she is due for one. Her bone density test is scheduled for December. A referral to gastroenterology will be made for her colonoscopy.    5. hyperlipidemia.  She is stable on Zetia prescriptions will be refilled. Her lipofit shows that her chol is very well controlled.  Enc pt to improve healthy diet and continue regular exercise.     Follow-up  The patient will follow up in 1 month for a blood pressure check and lab review.      ORDERS:  1. Hyperlipidemia LDL goal <130      2. High serum vitamin B12    - VITAMIN B12; Future    3. Stage 2 chronic kidney disease    - Basic Metabolic Panel; Future  - MICROALBUMIN CREAT RATIO URINE; Future    4. Encounter for screening mammogram for malignant neoplasm of breast    - MA-SCREENING MAMMO BILAT W/TOMOSYNTHESIS W/CAD; Future    5. Encounter for screening for malignant neoplasm  of colon    - Referral to Gastroenterology    6. Elevated blood pressure reading without diagnosis of hypertension          Please note that this dictation was created using voice recognition software. I have made every reasonable attempt to correct obvious errors, but I expect that there are errors of grammar and possibly content that I did not discover before finalizing the note.      Attestation      Verbal consent was acquired by the patient to use [employer] ambient listening note generation during this visit Yes

## 2025-02-25 DIAGNOSIS — Z79.890 POSTMENOPAUSAL HRT (HORMONE REPLACEMENT THERAPY): ICD-10-CM

## 2025-02-26 RX ORDER — ESTRADIOL 0.03 MG/D
FILM, EXTENDED RELEASE TRANSDERMAL
Qty: 24 PATCH | Refills: 0 | Status: SHIPPED | OUTPATIENT
Start: 2025-02-26

## 2025-02-26 NOTE — TELEPHONE ENCOUNTER
Received request via: Pharmacy    Was the patient seen in the last year in this department? Yes    Does the patient have an active prescription (recently filled or refills available) for medication(s) requested? No    [facility]     Does the patient have halfway Plus and need 100-day supply? (This applies to ALL medications) Patient does not have SCP

## 2025-03-10 ENCOUNTER — HOSPITAL ENCOUNTER (OUTPATIENT)
Dept: LAB | Facility: MEDICAL CENTER | Age: 80
End: 2025-03-10
Attending: NURSE PRACTITIONER
Payer: MEDICARE

## 2025-03-10 DIAGNOSIS — R79.89 HIGH SERUM VITAMIN B12: ICD-10-CM

## 2025-03-10 DIAGNOSIS — N18.2 STAGE 2 CHRONIC KIDNEY DISEASE: ICD-10-CM

## 2025-03-10 PROCEDURE — 80048 BASIC METABOLIC PNL TOTAL CA: CPT

## 2025-03-10 PROCEDURE — 36415 COLL VENOUS BLD VENIPUNCTURE: CPT

## 2025-03-10 PROCEDURE — 82607 VITAMIN B-12: CPT

## 2025-03-10 PROCEDURE — 82043 UR ALBUMIN QUANTITATIVE: CPT

## 2025-03-10 PROCEDURE — 82570 ASSAY OF URINE CREATININE: CPT

## 2025-03-11 ENCOUNTER — RESULTS FOLLOW-UP (OUTPATIENT)
Dept: MEDICAL GROUP | Facility: MEDICAL CENTER | Age: 80
End: 2025-03-11
Payer: MEDICARE

## 2025-03-11 LAB
ANION GAP SERPL CALC-SCNC: 11 MMOL/L (ref 7–16)
BUN SERPL-MCNC: 18 MG/DL (ref 8–22)
CALCIUM SERPL-MCNC: 9.7 MG/DL (ref 8.5–10.5)
CHLORIDE SERPL-SCNC: 101 MMOL/L (ref 96–112)
CO2 SERPL-SCNC: 25 MMOL/L (ref 20–33)
CREAT SERPL-MCNC: 0.97 MG/DL (ref 0.5–1.4)
CREAT UR-MCNC: 19.1 MG/DL
GFR SERPLBLD CREATININE-BSD FMLA CKD-EPI: 60 ML/MIN/1.73 M 2
GLUCOSE SERPL-MCNC: 87 MG/DL (ref 65–99)
MICROALBUMIN UR-MCNC: <1.2 MG/DL
MICROALBUMIN/CREAT UR: NORMAL MG/G (ref 0–30)
POTASSIUM SERPL-SCNC: 4.5 MMOL/L (ref 3.6–5.5)
SODIUM SERPL-SCNC: 137 MMOL/L (ref 135–145)
VIT B12 SERPL-MCNC: 1834 PG/ML (ref 211–911)

## 2025-03-17 ENCOUNTER — APPOINTMENT (OUTPATIENT)
Dept: MEDICAL GROUP | Facility: MEDICAL CENTER | Age: 80
End: 2025-03-17
Payer: MEDICARE

## 2025-03-17 VITALS
BODY MASS INDEX: 23.83 KG/M2 | TEMPERATURE: 97 F | OXYGEN SATURATION: 93 % | DIASTOLIC BLOOD PRESSURE: 72 MMHG | HEART RATE: 69 BPM | HEIGHT: 63 IN | SYSTOLIC BLOOD PRESSURE: 124 MMHG | WEIGHT: 134.48 LBS

## 2025-03-17 DIAGNOSIS — N18.2 STAGE 2 CHRONIC KIDNEY DISEASE: Primary | ICD-10-CM

## 2025-03-17 DIAGNOSIS — R35.1 NOCTURIA: ICD-10-CM

## 2025-03-17 DIAGNOSIS — R79.89 HIGH SERUM VITAMIN B12: ICD-10-CM

## 2025-03-17 DIAGNOSIS — E78.5 HYPERLIPIDEMIA LDL GOAL <130: ICD-10-CM

## 2025-03-17 DIAGNOSIS — R39.198 VOIDING DIFFICULTY: ICD-10-CM

## 2025-03-17 DIAGNOSIS — M54.50 ACUTE LOW BACK PAIN WITHOUT SCIATICA, UNSPECIFIED BACK PAIN LATERALITY: ICD-10-CM

## 2025-03-17 LAB
APPEARANCE UR: CLEAR
BILIRUB UR STRIP-MCNC: NORMAL MG/DL
COLOR UR AUTO: YELLOW
GLUCOSE UR STRIP.AUTO-MCNC: NORMAL MG/DL
KETONES UR STRIP.AUTO-MCNC: NORMAL MG/DL
LEUKOCYTE ESTERASE UR QL STRIP.AUTO: NORMAL
NITRITE UR QL STRIP.AUTO: NORMAL
PH UR STRIP.AUTO: 6 [PH] (ref 5–8)
PROT UR QL STRIP: NORMAL MG/DL
RBC UR QL AUTO: NORMAL
SP GR UR STRIP.AUTO: 1.02
UROBILINOGEN UR STRIP-MCNC: 0.2 MG/DL

## 2025-03-17 PROCEDURE — 81002 URINALYSIS NONAUTO W/O SCOPE: CPT | Performed by: NURSE PRACTITIONER

## 2025-03-17 PROCEDURE — 3074F SYST BP LT 130 MM HG: CPT | Performed by: NURSE PRACTITIONER

## 2025-03-17 PROCEDURE — 99214 OFFICE O/P EST MOD 30 MIN: CPT | Performed by: NURSE PRACTITIONER

## 2025-03-17 PROCEDURE — 3078F DIAST BP <80 MM HG: CPT | Performed by: NURSE PRACTITIONER

## 2025-03-17 NOTE — PROGRESS NOTES
"Subjective:     Pt is 80 yo female  coming for review of high serum Vit B12 and CKD stage 2 as well as concerns for UTI and LBP.    Pt noted UTI like sx that have lasted approx a week where she feels like she cannot fully empty her bladder, gets up 3x a night to urinate, feels her bladder is full and a \"warm\" feeling when she urinates. Pt denies fevers/chills, n/v/d, dizziness, burning with urination or increased urgency.     Pt also noted low back pain that she associated with the possible UTI where it became more noticeable approx a week ago as well. Pain is described as an ache and no precipitating event or injury was noted. Pt endorses frequent repetitive movements of lifting/movements using her back where she stated that she is \"always lifting something\". Pain increases with movement and is relieved entirely with tylenol. Pain is minimal and only noticeable, but does not interfere w/ ADL's. Pt denies numbness/tingling or urinary incontinence.     Notable labs reviewed -  Bun - 18  Creatinine - 0.97  GFR - 60 a decrease from the previous 68. Pt notes that she doesn't normally drink enough water and that she [place] recently where she was on a plane for 15 hours. Pt normally drinks 2 cups of coffee a day and cannot recall how much water she normally drinks.    Micro Alb Creat ratio below designated limits  Vit B-12 - 1834 an increase from previous lab. Pt noted that she was taking B-12 vitamin 6 days out of the week before most recent lab.      Current medicines (including changes today)  Current Outpatient Medications   Medication Sig Dispense Refill    Estradiol (SHARON) 0.025 MG/24HR PATCH BIWEEKLY APPLY 1 TO AFFECTED AREA EVERY 72 HOURS 24 Patch 0    Non Formulary Request Apply 1 Application  topically every day. DHEA/prog/test 10/50/3mg/gm apply 1 ml topically daily except for sundays.  Sent to [facility] [address] Each 3    ezetimibe (ZETIA) 10 MG Tab TAKE 1 TABLET BY MOUTH EVERY DAY 90 Tablet 0    " Calcium Carb-Cholecalciferol (CALCIUM 1000 + D PO) Take  by mouth every day.      latanoprost (XALATAN) 0.005 % Solution        No current facility-administered medications for this visit.     Current Outpatient Medications   Medication Sig Dispense Refill    Estradiol (SHARON) 0.025 MG/24HR PATCH BIWEEKLY APPLY 1 TO AFFECTED AREA EVERY 72 HOURS 24 Patch 0    Non Formulary Request Apply 1 Application  topically every day. DHEA/prog/test 10/50/3mg/gm apply 1 ml topically daily except for sundays.  Sent to [address] [facility] [address] Each 3    ezetimibe (ZETIA) 10 MG Tab TAKE 1 TABLET BY MOUTH EVERY DAY 90 Tablet 0    Calcium Carb-Cholecalciferol (CALCIUM 1000 + D PO) Take  by mouth every day.      latanoprost (XALATAN) 0.005 % Solution        No current facility-administered medications for this visit.       She  has a past medical history of Age-related cataract of both eyes (12/16/2021), Allergic rhinitis due to allergen, B12 deficiency, Glaucoma (increased eye pressure), History of melanoma, Hormone replacement therapy (postmenopausal), Hyperlipemia, Osteopenia, and Vitamin D deficiency disease.      Microalbumin/creatinine Ratio:  Lab Results   Component Value Date/Time    URCREAT 19.10 03/10/2025 0940    MICROALBUR <1.2 03/10/2025 0940    MALBCRT see below 03/10/2025 0940     Complete Metabolic Panel:  Lab Results   Component Value Date/Time    SODIUM 137 03/10/2025 09:40 AM    POTASSIUM 4.5 03/10/2025 09:40 AM    CHLORIDE 101 03/10/2025 09:40 AM    CO2 25 03/10/2025 09:40 AM    ANION 11.0 03/10/2025 09:40 AM    GLUCOSE 87 03/10/2025 09:40 AM    BUN 18 03/10/2025 09:40 AM    CREATININE 0.97 03/10/2025 09:40 AM    CREATININE 0.65 10/30/2012 09:26 AM    ASTSGOT 13 01/02/2025 09:02 AM    ALTSGPT 11 01/02/2025 09:02 AM    TBILIRUBIN 0.6 01/02/2025 09:02 AM    ALBUMIN 3.9 01/02/2025 09:02 AM    TOTPROTEIN 6.5 01/02/2025 09:02 AM    GLOBULIN 2.6 01/02/2025 09:02 AM    AGRATIO 1.5 01/02/2025 09:02 AM       GFR:    Lab Results   Component  "Value Date/Time    GFRCKD 60 03/10/2025 0940         ROS   Review of Systems   Constitutional: Negative.  Negative for weight loss, malaise/fatigue and diaphoresis.   HENT: Negative.  Negative for hearing loss, ear pain, nosebleeds, congestion, sore throat, neck pain, tinnitus and ear discharge.    Respiratory: Negative.  Negative for cough, hemoptysis, sputum production, shortness of breath, wheezing and stridor.    Cardiovascular: Negative.  Negative for chest pain, palpitations, orthopnea, claudication, leg swelling and PND.   Gastrointestinal: denies constipation, heartburn, melena or hematochezia.  Genitourinary: Denies hematuria or frequency.    Musculoskeletal: Negative.  Negative for myalgias.   Neurological: Negative.  Negative for tremors, weakness and headaches.   Psych:  Denies depression, anxiety or insomnia.  All other systems reviewed and are negative.       Objective:     /72   Pulse 69   Temp 36.1 °C (97 °F) (Temporal)   Ht 1.6 m (5' 3\")   Wt 61 kg (134 lb 7.7 oz)   SpO2 93%  Body mass index is 23.82 kg/m².     Physical Exam   Vitals reviewed.  Constitutional: oriented to person, place, and time. appears well-developed and well-nourished. No distress.   Neck: No JVD present.  Cardiovascular: Normal rate, regular rhythm, normal heart sounds and intact distal pulses.  Exam reveals no gallop and no friction rub.  No murmur heard.  No carotid bruits.   Pulmonary/Chest: Effort normal and breath sounds normal. No stridor. No respiratory distress. no wheezes or rales. exhibits no tenderness.    - Negative for CVA tenderness or bladder pain upon deep palpation.   Musculoskeletal: Normal range of motion. exhibits no edema. radha pedal pulses 2+.  Lymphadenopathy: no cervical or supraclavicular adenopathy.   Neurological: alert and oriented to person, place, and time. exhibits normal muscle tone. Coordination normal.   Skin: Skin is warm and dry. no diaphoresis.   Psychiatric: normal mood and " affect. behavior is normal.       Assessment and Plan:   The following treatment plan was discussed    UTI - POCT UA was negative. Pt will increase oral hydration of water and decrease intake of acidic foods. Pt will call or RTC for new or worsening sx. Go to ER for CP, ALOC, syncope, SOB.     LBP - Continue taking tylenol as needed and try conservative measures like voltaren cream, icy hot, ice or heat compress or massage for pain relief. Call or RTC for new or worsening sx. Go to ER fr new incontinence, numbness/tingling, or new onset bowel or bladder changes.     High Serum Vit B-12 - Pt will now only take B-12 vitamin 4 days every week instead of previous 6 day regimen.     Stage 2 CKD - GFR at 60. Pt will increase oral hydration to approx 64 ounces everyday. Encouraged to get water flavoring packets, drink other forms of liquid like low sugar juices, and decrease coffee intake. Pt not taking NSAIDs, but agreed to avoid r/t low GFR.     LDL goal <130 - Has been stable. Pt eats healthy diet avoiding fatty foods and exercises 1 a week on stationary bike. Pt will try to increase amount of exercise she gets. Future lab ordered before next visit.     Follow-up  6 months or sooner for new or worsening sx.     ORDERS:  1. UTI symptoms    - POCT Urinalysis    2. High serum vitamin B12    - VITAMIN B12; Future    3. Stage 2 chronic kidney disease    - Comp Metabolic Panel; Future    4. Hyperlipidemia LDL goal <130    - Lipid Profile; Future        Please note that this dictation was created using voice recognition software. I have made every reasonable attempt to correct obvious errors, but I expect that there are errors of grammar and possibly content that I did not discover before finalizing the note.          Verbal consent was acquired by the patient to use [employer] ambient listening note generation during this visit Yes

## 2025-03-20 NOTE — PROGRESS NOTES
"Subjective:     History of Present Illness  Pt is 78 yo female  coming for review of high serum Vit B12 and CKD stage 2 as well as concerns for UTI and LBP.     Pt noted UTI like sx that have lasted approx a week where she feels like she cannot fully empty her bladder, gets up 3x a night to urinate, feels her bladder is full and a \"warm\" feeling when she urinates. Pt denies fevers/chills, n/v/d, dizziness, burning with urination or increased urgency.      Pt also noted low back pain that she associated with the possible UTI where it became more noticeable approx a week ago as well. Pain is described as an ache and no precipitating event or injury was noted. Pt endorses frequent repetitive movements of lifting/movements using her back where she stated that she is \"always lifting something\". Pain increases with movement and is relieved entirely with tylenol. Pain is minimal and only noticeable, but does not interfere w/ ADL's. Pt denies numbness/tingling or urinary incontinence.     labs reviewed with pt:  BMP: wnl  GFR - 60 a decrease from the previous 68. Pt notes that she doesn't normally drink enough water and that she went [place] a trip [place] recently where she was on a plane for 15 hours. Pt normally drinks 2 cups of coffee a day and cannot recall how much water she normally drinks.    Micro Alb Creat ratio outside measurement range, needs recollect  Vit B-12 - 1834 an increase from previous lab. Pt noted that she was taking B-12 vitamin 6 days out of the week before most recent lab.            Microalbumin/creatinine Ratio:        Lab Results   Component Value Date/Time     URCREAT 19.10 03/10/2025 0940     MICROALBUR <1.2 03/10/2025 0940     MALBCRT see below 03/10/2025 0940      Complete Metabolic Panel:        Lab Results   Component Value Date/Time     SODIUM 137 03/10/2025 09:40 AM     POTASSIUM 4.5 03/10/2025 09:40 AM     CHLORIDE 101 03/10/2025 09:40 AM     CO2 25 03/10/2025 09:40 AM     ANION 11.0 " "03/10/2025 09:40 AM     GLUCOSE 87 03/10/2025 09:40 AM     BUN 18 03/10/2025 09:40 AM     CREATININE 0.97 03/10/2025 09:40 AM     CREATININE 0.65 10/30/2012 09:26 AM     ASTSGOT 13 01/02/2025 09:02 AM     ALTSGPT 11 01/02/2025 09:02 AM     TBILIRUBIN 0.6 01/02/2025 09:02 AM     ALBUMIN 3.9 01/02/2025 09:02 AM     TOTPROTEIN 6.5 01/02/2025 09:02 AM     GLOBULIN 2.6 01/02/2025 09:02 AM     AGRATIO 1.5 01/02/2025 09:02 AM       GFR:           Lab Results   Component Value Date/Time     GFRCKD 60 03/10/2025 0940            ROS   Review of Systems   Constitutional: Negative.  Negative for weight loss, malaise/fatigue and diaphoresis.   HENT: Negative.  Negative for hearing loss, ear pain, nosebleeds, congestion, sore throat, neck pain, tinnitus and ear discharge.    Respiratory: Negative.  Negative for cough, hemoptysis, sputum production, shortness of breath, wheezing and stridor.    Cardiovascular: Negative.  Negative for chest pain, palpitations, orthopnea, claudication, leg swelling and PND.   Gastrointestinal: denies constipation, heartburn, melena or hematochezia.  Genitourinary: Denies hematuria or frequency.    Musculoskeletal: Negative.  Negative for myalgias.   Neurological: Negative.  Negative for tremors, weakness and headaches.   Psych:  Denies depression, anxiety or insomnia.  All other systems reviewed and are negative.         Objective:      /72   Pulse 69   Temp 36.1 °C (97 °F) (Temporal)   Ht 1.6 m (5' 3\")   Wt 61 kg (134 lb 7.7 oz)   SpO2 93%  Body mass index is 23.82 kg/m².      Physical Exam   Vitals reviewed.  Constitutional: oriented to person, place, and time. appears well-developed and well-nourished. No distress.   Neck: No JVD present.  Cardiovascular: Normal rate, regular rhythm, normal heart sounds and intact distal pulses.  Exam reveals no gallop and no friction rub.  No murmur heard.  No carotid bruits.   Pulmonary/Chest: Effort normal and breath sounds normal. No stridor. No " respiratory distress. no wheezes or rales. exhibits no tenderness.    - Negative for CVA tenderness or bladder pain upon deep palpation.   Musculoskeletal: Normal range of motion. exhibits no edema. radha pedal pulses 2+.  Lymphadenopathy: no cervical or supraclavicular adenopathy.   Neurological: alert and oriented to person, place, and time. exhibits normal muscle tone. Coordination normal.   Skin: Skin is warm and dry. no diaphoresis.   Psychiatric: normal mood and affect. behavior is normal.         Assessment and Plan:   The following treatment plan was discussed     UTI with voiding difficulty & nocturia - POCT UA was negative. Pt will increase oral hydration of water and decrease intake of acidic foods. Pt will call or RTC for new or worsening sx. Go to ER for CP, ALOC, syncope, SOB.      LBP - Continue taking tylenol as needed and try conservative measures like voltaren cream, icy hot, ice or heat compress or massage for pain relief. Call or RTC for new or worsening sx. Go to ER fr new incontinence, numbness/tingling, or new onset bowel or bladder changes.      High Serum Vit B-12 - Pt will now only take B-12 vitamin 4 days every week instead of previous 6 day regimen.      Stage 2 CKD - GFR at 60. Pt will increase oral hydration to approx 64 ounces everyday. Encouraged to get water flavoring packets, drink other forms of liquid like low sugar juices, and decrease coffee intake. Pt not taking NSAIDs, but agreed to avoid r/t low GFR.      LDL goal <130 - Has been stable. Pt eats healthy diet avoiding fatty foods and exercises 1 a week on stationary bike. Pt will try to increase amount of exercise she gets. Future lab ordered before next visit.      Follow-up  6 months or sooner for new or worsening sx.      ORDERS:  1. UTI symptoms     - POCT Urinalysis     2. High serum vitamin B12     - VITAMIN B12; Future     3. Stage 2 chronic kidney disease     - Comp Metabolic Panel; Future     4. Hyperlipidemia LDL  goal <130     - Lipid Profile; Future           Please note that this dictation was created using voice recognition software. I have made every reasonable attempt to correct obvious errors, but I expect that there are errors of grammar and possibly content that I did not discover before finalizing the note.             Verbal consent was acquired by the patient to use [employer] ambient listening note generation during this visit Yes

## 2025-05-15 ENCOUNTER — HOSPITAL ENCOUNTER (OUTPATIENT)
Dept: RADIOLOGY | Facility: MEDICAL CENTER | Age: 80
End: 2025-05-15
Attending: NURSE PRACTITIONER
Payer: MEDICARE

## 2025-05-15 DIAGNOSIS — Z12.31 ENCOUNTER FOR SCREENING MAMMOGRAM FOR MALIGNANT NEOPLASM OF BREAST: ICD-10-CM

## 2025-05-15 PROCEDURE — 77067 SCR MAMMO BI INCL CAD: CPT

## 2025-05-20 DIAGNOSIS — Z79.890 POSTMENOPAUSAL HRT (HORMONE REPLACEMENT THERAPY): ICD-10-CM

## 2025-05-22 DIAGNOSIS — Z79.890 POSTMENOPAUSAL HRT (HORMONE REPLACEMENT THERAPY): ICD-10-CM

## 2025-05-22 NOTE — TELEPHONE ENCOUNTER
Received request via: Patient    Was the patient seen in the last year in this department? Yes    Does the patient have an active prescription (recently filled or refills available) for medication(s) requested? No    Pharmacy Name: [id number]    Does the patient have FDC Plus and need 100-day supply? (This applies to ALL medications) Patient does not have SCP

## 2025-05-24 RX ORDER — ESTRADIOL 0.03 MG/D
FILM, EXTENDED RELEASE TRANSDERMAL
Qty: 24 PATCH | Refills: 1 | Status: SHIPPED | OUTPATIENT
Start: 2025-05-24

## 2025-05-26 RX ORDER — ESTRADIOL 0.03 MG/D
FILM, EXTENDED RELEASE TRANSDERMAL
Qty: 24 PATCH | Refills: 1 | Status: SHIPPED | OUTPATIENT
Start: 2025-05-26

## 2025-08-14 ENCOUNTER — HOSPITAL ENCOUNTER (OUTPATIENT)
Dept: LAB | Facility: MEDICAL CENTER | Age: 80
End: 2025-08-14
Attending: NURSE PRACTITIONER
Payer: MEDICARE

## 2025-08-14 DIAGNOSIS — R79.89 HIGH SERUM VITAMIN B12: ICD-10-CM

## 2025-08-14 DIAGNOSIS — N18.2 STAGE 2 CHRONIC KIDNEY DISEASE: ICD-10-CM

## 2025-08-14 DIAGNOSIS — E78.5 HYPERLIPIDEMIA LDL GOAL <130: ICD-10-CM

## 2025-08-14 LAB
ALBUMIN SERPL BCP-MCNC: 4.2 G/DL (ref 3.2–4.9)
ALBUMIN/GLOB SERPL: 1.6 G/DL
ALP SERPL-CCNC: 59 U/L (ref 30–99)
ALT SERPL-CCNC: 15 U/L (ref 2–50)
ANION GAP SERPL CALC-SCNC: 11 MMOL/L (ref 7–16)
AST SERPL-CCNC: 22 U/L (ref 12–45)
BILIRUB SERPL-MCNC: 0.6 MG/DL (ref 0.1–1.5)
BUN SERPL-MCNC: 17 MG/DL (ref 8–22)
CALCIUM ALBUM COR SERPL-MCNC: 9 MG/DL (ref 8.5–10.5)
CALCIUM SERPL-MCNC: 9.2 MG/DL (ref 8.5–10.5)
CHLORIDE SERPL-SCNC: 103 MMOL/L (ref 96–112)
CHOLEST SERPL-MCNC: 221 MG/DL (ref 100–199)
CO2 SERPL-SCNC: 24 MMOL/L (ref 20–33)
CREAT SERPL-MCNC: 1.09 MG/DL (ref 0.5–1.4)
GFR SERPLBLD CREATININE-BSD FMLA CKD-EPI: 52 ML/MIN/1.73 M 2
GLOBULIN SER CALC-MCNC: 2.7 G/DL (ref 1.9–3.5)
GLUCOSE SERPL-MCNC: 85 MG/DL (ref 65–99)
HDLC SERPL-MCNC: 84 MG/DL
LDLC SERPL CALC-MCNC: 125 MG/DL
POTASSIUM SERPL-SCNC: 4.5 MMOL/L (ref 3.6–5.5)
PROT SERPL-MCNC: 6.9 G/DL (ref 6–8.2)
SODIUM SERPL-SCNC: 138 MMOL/L (ref 135–145)
TRIGL SERPL-MCNC: 62 MG/DL (ref 0–149)
VIT B12 SERPL-MCNC: 901 PG/ML (ref 211–911)

## 2025-08-14 PROCEDURE — 80061 LIPID PANEL: CPT

## 2025-08-14 PROCEDURE — 82607 VITAMIN B-12: CPT

## 2025-08-14 PROCEDURE — 80053 COMPREHEN METABOLIC PANEL: CPT

## 2025-08-14 PROCEDURE — 36415 COLL VENOUS BLD VENIPUNCTURE: CPT

## 2025-08-26 ENCOUNTER — OFFICE VISIT (OUTPATIENT)
Dept: MEDICAL GROUP | Facility: MEDICAL CENTER | Age: 80
End: 2025-08-26
Payer: COMMERCIAL

## 2025-08-26 VITALS
SYSTOLIC BLOOD PRESSURE: 118 MMHG | HEART RATE: 58 BPM | HEIGHT: 63 IN | OXYGEN SATURATION: 98 % | BODY MASS INDEX: 24.08 KG/M2 | TEMPERATURE: 99.1 F | WEIGHT: 135.91 LBS | DIASTOLIC BLOOD PRESSURE: 72 MMHG

## 2025-08-26 DIAGNOSIS — E78.5 HYPERLIPIDEMIA LDL GOAL <130: ICD-10-CM

## 2025-08-26 DIAGNOSIS — Z79.890 POSTMENOPAUSAL HRT (HORMONE REPLACEMENT THERAPY): ICD-10-CM

## 2025-08-26 DIAGNOSIS — N18.31 STAGE 3A CHRONIC KIDNEY DISEASE: Primary | ICD-10-CM

## 2025-08-26 DIAGNOSIS — E55.9 VITAMIN D DEFICIENCY: ICD-10-CM

## 2025-08-26 PROCEDURE — 3074F SYST BP LT 130 MM HG: CPT | Performed by: NURSE PRACTITIONER

## 2025-08-26 PROCEDURE — 3078F DIAST BP <80 MM HG: CPT | Performed by: NURSE PRACTITIONER

## 2025-08-26 PROCEDURE — 99214 OFFICE O/P EST MOD 30 MIN: CPT | Performed by: NURSE PRACTITIONER

## 2025-08-26 RX ORDER — ESTRADIOL 0.03 MG/D
FILM, EXTENDED RELEASE TRANSDERMAL
Qty: 24 PATCH | Refills: 1 | Status: SHIPPED | OUTPATIENT
Start: 2025-08-26

## 2025-08-26 ASSESSMENT — PATIENT HEALTH QUESTIONNAIRE - PHQ9: CLINICAL INTERPRETATION OF PHQ2 SCORE: 0
